# Patient Record
Sex: MALE | Race: WHITE | NOT HISPANIC OR LATINO | Employment: OTHER | URBAN - METROPOLITAN AREA
[De-identification: names, ages, dates, MRNs, and addresses within clinical notes are randomized per-mention and may not be internally consistent; named-entity substitution may affect disease eponyms.]

---

## 2019-02-15 ENCOUNTER — HOSPITAL ENCOUNTER (OUTPATIENT)
Dept: LAB | Facility: HOSPITAL | Age: 64
Discharge: HOME | End: 2019-02-15
Attending: INTERNAL MEDICINE

## 2019-02-15 LAB
ALBUMIN SERPL-MCNC: 4.2 G/DL (ref 3.5–5)
ALBUMIN/GLOB SERPL: 1.3 {RATIO} (ref 1.4–2.6)
ALP SERPL-CCNC: 62 U/L (ref 56–155)
ALT SERPL-CCNC: 35 U/L (ref 10–40)
ANION GAP SERPL CALC-SCNC: 22 MMOL/L (ref 8–19)
AST SERPL-CCNC: 28 U/L (ref 15–50)
BASOPHILS # BLD AUTO: 0.05 10*3/UL (ref 0–0.2)
BASOPHILS NFR BLD AUTO: 0.7 % (ref 0–3)
BILIRUB SERPL-MCNC: 0.5 MG/DL (ref 0.2–1.3)
BUN SERPL-MCNC: 21 MG/DL (ref 5–25)
BUN/CREAT SERPL: 17 {RATIO} (ref 6–20)
CALCIUM SERPL-MCNC: 9.2 MG/DL (ref 8.7–10.4)
CHLORIDE SERPL-SCNC: 103 MMOL/L (ref 99–111)
CHOLEST SERPL-MCNC: 138 MG/DL (ref 107–200)
CHOLEST/HDLC SERPL: 3.4 {RATIO} (ref 3–6)
CONV ABS IMM GRAN: 0.02 10*3/UL (ref 0–0.2)
CONV CO2: 21 MMOL/L (ref 22–32)
CONV IMMATURE GRAN: 0.3 % (ref 0–1.8)
CONV TOTAL PROTEIN: 7.4 G/DL (ref 6.3–8.2)
CREAT UR-MCNC: 1.21 MG/DL (ref 0.7–1.2)
DEPRECATED RDW RBC AUTO: 51.3 FL (ref 35.1–43.9)
EOSINOPHIL # BLD AUTO: 0.37 10*3/UL (ref 0–0.7)
EOSINOPHIL # BLD AUTO: 5.2 % (ref 0–7)
ERYTHROCYTE [DISTWIDTH] IN BLOOD BY AUTOMATED COUNT: 13.7 % (ref 11.6–14.4)
GFR SERPLBLD BASED ON 1.73 SQ M-ARVRAT: >60 ML/MIN/{1.73_M2}
GLOBULIN UR ELPH-MCNC: 3.2 G/DL (ref 2–3.5)
GLUCOSE SERPL-MCNC: 103 MG/DL (ref 70–99)
HBA1C MFR BLD: 16.1 G/DL (ref 14–18)
HCT VFR BLD AUTO: 48.7 % (ref 42–52)
HDLC SERPL-MCNC: 41 MG/DL (ref 40–60)
LDLC SERPL CALC-MCNC: 66 MG/DL (ref 70–100)
LYMPHOCYTES # BLD AUTO: 2.13 10*3/UL (ref 1–5)
MCH RBC QN AUTO: 33.4 PG (ref 27–31)
MCHC RBC AUTO-ENTMCNC: 33.1 G/DL (ref 33–37)
MCV RBC AUTO: 101 FL (ref 80–96)
MONOCYTES # BLD AUTO: 0.51 10*3/UL (ref 0.2–1.2)
MONOCYTES NFR BLD AUTO: 7.2 % (ref 3–10)
NEUTROPHILS # BLD AUTO: 4.03 10*3/UL (ref 2–8)
NEUTROPHILS NFR BLD AUTO: 56.6 % (ref 30–85)
NRBC CBCN: 0 % (ref 0–0.7)
OSMOLALITY SERPL CALC.SUM OF ELEC: 295 MOSM/KG (ref 273–304)
PLATELET # BLD AUTO: 253 10*3/UL (ref 130–400)
PMV BLD AUTO: 10.5 FL (ref 9.4–12.4)
POTASSIUM SERPL-SCNC: 4.6 MMOL/L (ref 3.5–5.3)
PSA SERPL-MCNC: 2.45 NG/ML (ref 0–4)
RBC # BLD AUTO: 4.82 10*6/UL (ref 4.7–6.1)
SODIUM SERPL-SCNC: 141 MMOL/L (ref 135–147)
TRIGL SERPL-MCNC: 157 MG/DL (ref 40–150)
VARIANT LYMPHS NFR BLD MANUAL: 30 % (ref 20–45)
VLDLC SERPL-MCNC: 31 MG/DL (ref 5–37)
WBC # BLD AUTO: 7.11 10*3/UL (ref 4.8–10.8)

## 2019-03-01 ENCOUNTER — HOSPITAL ENCOUNTER (OUTPATIENT)
Dept: URGENT CARE | Facility: CLINIC | Age: 64
Discharge: HOME | End: 2019-03-01
Attending: NURSE PRACTITIONER

## 2019-07-09 ENCOUNTER — TRANSCRIBE ORDERS (OUTPATIENT)
Dept: ADMINISTRATIVE | Facility: HOSPITAL | Age: 64
End: 2019-07-09

## 2019-07-09 DIAGNOSIS — I65.23 CAROTID STENOSIS, BILATERAL: Primary | ICD-10-CM

## 2019-07-18 ENCOUNTER — HOSPITAL ENCOUNTER (OUTPATIENT)
Dept: CT IMAGING | Facility: HOSPITAL | Age: 64
Discharge: HOME | End: 2019-07-18
Admitting: SURGERY

## 2019-07-18 DIAGNOSIS — I65.23 CAROTID STENOSIS, BILATERAL: ICD-10-CM

## 2019-07-18 LAB — CREAT BLDA-MCNC: 1 MG/DL (ref 0.6–1.3)

## 2019-07-18 PROCEDURE — 82565 ASSAY OF CREATININE: CPT

## 2019-07-18 PROCEDURE — 70496 CT ANGIOGRAPHY HEAD: CPT

## 2019-07-18 PROCEDURE — 25010000002 IOPAMIDOL 61 % SOLUTION: Performed by: SURGERY

## 2019-07-18 PROCEDURE — 70498 CT ANGIOGRAPHY NECK: CPT

## 2019-07-18 RX ADMIN — IOPAMIDOL 95 ML: 612 INJECTION, SOLUTION INTRAVENOUS at 11:48

## 2019-08-13 ENCOUNTER — HOSPITAL ENCOUNTER (OUTPATIENT)
Dept: LAB | Facility: HOSPITAL | Age: 64
Discharge: HOME | End: 2019-08-13
Attending: INTERNAL MEDICINE

## 2019-08-13 LAB
ALBUMIN SERPL-MCNC: 4.3 G/DL (ref 3.5–5)
ALBUMIN/GLOB SERPL: 1.5 {RATIO} (ref 1.4–2.6)
ALP SERPL-CCNC: 63 U/L (ref 56–155)
ALT SERPL-CCNC: 39 U/L (ref 10–40)
ANION GAP SERPL CALC-SCNC: 19 MMOL/L (ref 8–19)
AST SERPL-CCNC: 26 U/L (ref 15–50)
BILIRUB SERPL-MCNC: 0.33 MG/DL (ref 0.2–1.3)
BUN SERPL-MCNC: 19 MG/DL (ref 5–25)
BUN/CREAT SERPL: 18 {RATIO} (ref 6–20)
CALCIUM SERPL-MCNC: 9.5 MG/DL (ref 8.7–10.4)
CHLORIDE SERPL-SCNC: 104 MMOL/L (ref 99–111)
CONV CO2: 23 MMOL/L (ref 22–32)
CONV TOTAL PROTEIN: 7.2 G/DL (ref 6.3–8.2)
CREAT UR-MCNC: 1.07 MG/DL (ref 0.7–1.2)
EST. AVERAGE GLUCOSE BLD GHB EST-MCNC: 123 MG/DL
GFR SERPLBLD BASED ON 1.73 SQ M-ARVRAT: >60 ML/MIN/{1.73_M2}
GLOBULIN UR ELPH-MCNC: 2.9 G/DL (ref 2–3.5)
GLUCOSE SERPL-MCNC: 103 MG/DL (ref 70–99)
HBA1C MFR BLD: 5.9 % (ref 3.5–5.7)
OSMOLALITY SERPL CALC.SUM OF ELEC: 295 MOSM/KG (ref 273–304)
POTASSIUM SERPL-SCNC: 4.7 MMOL/L (ref 3.5–5.3)
SODIUM SERPL-SCNC: 141 MMOL/L (ref 135–147)

## 2019-08-15 ENCOUNTER — OFFICE VISIT (OUTPATIENT)
Dept: CARDIOLOGY | Facility: CLINIC | Age: 64
End: 2019-08-15

## 2019-08-15 ENCOUNTER — APPOINTMENT (OUTPATIENT)
Dept: PREADMISSION TESTING | Facility: HOSPITAL | Age: 64
End: 2019-08-15

## 2019-08-15 VITALS
HEART RATE: 60 BPM | BODY MASS INDEX: 29.06 KG/M2 | OXYGEN SATURATION: 97 % | DIASTOLIC BLOOD PRESSURE: 73 MMHG | HEIGHT: 70 IN | RESPIRATION RATE: 16 BRPM | SYSTOLIC BLOOD PRESSURE: 154 MMHG | TEMPERATURE: 97.6 F | WEIGHT: 203 LBS

## 2019-08-15 VITALS
WEIGHT: 223.4 LBS | BODY MASS INDEX: 31.98 KG/M2 | DIASTOLIC BLOOD PRESSURE: 90 MMHG | HEART RATE: 59 BPM | HEIGHT: 70 IN | SYSTOLIC BLOOD PRESSURE: 152 MMHG

## 2019-08-15 DIAGNOSIS — Z01.810 PREOPERATIVE CARDIOVASCULAR EXAMINATION: Primary | ICD-10-CM

## 2019-08-15 DIAGNOSIS — E78.2 MIXED HYPERLIPIDEMIA: ICD-10-CM

## 2019-08-15 DIAGNOSIS — I65.21 STENOSIS OF RIGHT CAROTID ARTERY: ICD-10-CM

## 2019-08-15 DIAGNOSIS — R06.09 DYSPNEA ON EXERTION: ICD-10-CM

## 2019-08-15 DIAGNOSIS — I10 ESSENTIAL HYPERTENSION: ICD-10-CM

## 2019-08-15 DIAGNOSIS — Z86.73 H/O: CVA (CEREBROVASCULAR ACCIDENT): ICD-10-CM

## 2019-08-15 LAB
DEPRECATED RDW RBC AUTO: 50.9 FL (ref 37–54)
ERYTHROCYTE [DISTWIDTH] IN BLOOD BY AUTOMATED COUNT: 13.8 % (ref 12.3–15.4)
HCT VFR BLD AUTO: 46.7 % (ref 37.5–51)
HGB BLD-MCNC: 15.6 G/DL (ref 13–17.7)
MCH RBC QN AUTO: 33.5 PG (ref 26.6–33)
MCHC RBC AUTO-ENTMCNC: 33.4 G/DL (ref 31.5–35.7)
MCV RBC AUTO: 100.2 FL (ref 79–97)
PLATELET # BLD AUTO: 223 10*3/MM3 (ref 140–450)
PMV BLD AUTO: 10.3 FL (ref 6–12)
RBC # BLD AUTO: 4.66 10*6/MM3 (ref 4.14–5.8)
WBC NRBC COR # BLD: 6.67 10*3/MM3 (ref 3.4–10.8)

## 2019-08-15 PROCEDURE — 36415 COLL VENOUS BLD VENIPUNCTURE: CPT

## 2019-08-15 PROCEDURE — 99204 OFFICE O/P NEW MOD 45 MIN: CPT | Performed by: INTERNAL MEDICINE

## 2019-08-15 PROCEDURE — 85027 COMPLETE CBC AUTOMATED: CPT | Performed by: SURGERY

## 2019-08-15 PROCEDURE — 93000 ELECTROCARDIOGRAM COMPLETE: CPT | Performed by: INTERNAL MEDICINE

## 2019-08-15 RX ORDER — DULOXETIN HYDROCHLORIDE 60 MG/1
1 CAPSULE, DELAYED RELEASE ORAL NIGHTLY
Refills: 0 | COMMUNITY
Start: 2019-06-25 | End: 2021-09-20

## 2019-08-15 RX ORDER — CLOPIDOGREL BISULFATE 75 MG/1
1 TABLET ORAL DAILY
Refills: 2 | COMMUNITY
Start: 2019-08-08 | End: 2022-05-24

## 2019-08-15 RX ORDER — QUINAPRIL 40 MG/1
40 TABLET ORAL NIGHTLY
Qty: 90 TABLET | Refills: 3 | Status: SHIPPED | COMMUNITY
Start: 2019-08-15 | End: 2021-09-17

## 2019-08-15 RX ORDER — ISOSORBIDE MONONITRATE 30 MG/1
30 TABLET, EXTENDED RELEASE ORAL AS NEEDED
Status: ON HOLD | COMMUNITY
End: 2019-08-29

## 2019-08-15 RX ORDER — ATORVASTATIN CALCIUM 20 MG/1
1 TABLET, FILM COATED ORAL NIGHTLY
Refills: 3 | COMMUNITY
Start: 2019-08-08 | End: 2021-07-08 | Stop reason: SDUPTHER

## 2019-08-15 RX ORDER — QUINAPRIL 20 MG/1
1 TABLET ORAL DAILY
Refills: 3 | COMMUNITY
Start: 2019-08-08 | End: 2019-08-15

## 2019-08-15 RX ORDER — LIDOCAINE 50 MG/G
1 PATCH TOPICAL AS NEEDED
Status: ON HOLD | COMMUNITY
End: 2019-08-29

## 2019-08-15 RX ORDER — AMLODIPINE BESYLATE 2.5 MG/1
2.5 TABLET ORAL DAILY
Qty: 90 TABLET | Refills: 3 | Status: SHIPPED | OUTPATIENT
Start: 2019-08-15 | End: 2022-06-15

## 2019-08-15 RX ORDER — NITROGLYCERIN 0.4 MG/1
0.4 TABLET SUBLINGUAL
COMMUNITY
End: 2021-11-24

## 2019-08-15 RX ORDER — CETIRIZINE HYDROCHLORIDE 10 MG/1
10 TABLET ORAL DAILY
COMMUNITY

## 2019-08-15 NOTE — DISCHARGE INSTRUCTIONS
Take the following medications the morning of surgery with a small sip of water:  AMLODIPINE      General Instructions: CLEAR LIQUIDS UNTIL 4:30 AM MORNING OF SURGERY  • Do not eat solid food after midnight the night before surgery.  • You may drink clear liquids day of surgery but must stop at least one hour before your hospital arrival time.  • It is beneficial for you to have a clear drink that contains carbohydrates the day of surgery.  We suggest a 12 to 20 ounce bottle of Gatorade or Powerade for non-diabetic patients or a 12 to 20 ounce bottle of G2 or Powerade Zero for diabetic patients. (Pediatric patients, are not advised to drink a 12 to 20 ounce carbohydrate drink)    Clear liquids are liquids you can see through.  Nothing red in color.     Plain water                               Sports drinks  Sodas                                   Gelatin (Jell-O)  Fruit juices without pulp such as white grape juice and apple juice  Popsicles that contain no fruit or yogurt  Tea or coffee (no cream or milk added)  Gatorade / Powerade  G2 / Powerade Zero    • Infants may have breast milk up to four hours before surgery.  • Infants drinking formula may drink formula up to six hours before surgery.   • Patients who avoid smoking, chewing tobacco and alcohol for 4 weeks prior to surgery have a reduced risk of post-operative complications.  Quit smoking as many days before surgery as you can.  • Do not smoke, use chewing tobacco or drink alcohol the day of surgery.   • If applicable bring your C-PAP/ BI-PAP machine.  • Bring any papers given to you in the doctor’s office.  • Wear clean comfortable clothes and socks.  • Do not wear contact lenses, false eyelashes or make-up.  Bring a case for your glasses.   • Bring crutches or walker if applicable.  • Remove all piercings.  Leave jewelry and any other valuables at home.  • Hair extensions with metal clips must be removed prior to surgery.  • The Pre-Admission Testing  nurse will instruct you to bring medications if unable to obtain an accurate list in Pre-Admission Testing.        If you were given a blood bank ID arm band remember to bring it with you the day of surgery.    Preventing a Surgical Site Infection:  • For 2 to 3 days before surgery, avoid shaving with a razor because the razor can irritate skin and make it easier to develop an infection.    • Any areas of open skin can increase the risk of a post-operative wound infection by allowing bacteria to enter and travel throughout the body.  Notify your surgeon if you have any skin wounds / rashes even if it is not near the expected surgical site.  The area will need assessed to determine if surgery should be delayed until it is healed.  • The night prior to surgery sleep in a clean bed with clean clothing.  Do not allow pets to sleep with you.  • Shower on the morning of surgery using a fresh bar of anti-bacterial soap (such as Dial) and clean washcloth.  Dry with a clean towel and dress in clean clothing.  • Ask your surgeon if you will be receiving antibiotics prior to surgery.  • Make sure you, your family, and all healthcare providers clean their hands with soap and water or an alcohol based hand  before caring for you or your wound.    Day of surgery: 8/29/2019 ARRIVAL TIME 5:30 AM  Upon arrival, a Pre-op nurse and Anesthesiologist will review your health history, obtain vital signs, and answer questions you may have.  The only belongings needed at this time will be a list of your home medications and if applicable your C-PAP/BI-PAP machine.  If you are staying overnight your family can leave the rest of your belongings in the car and bring them to your room later.  A Pre-op nurse will start an IV and you may receive medication in preparation for surgery, including something to help you relax.  Your family will be able to see you in the Pre-op area.  While you are in surgery your family should notify the  waiting room  if they leave the waiting room area and provide a contact phone number.    Please be aware that surgery does come with discomfort.  We want to make every effort to control your discomfort so please discuss any uncontrolled symptoms with your nurse.   Your doctor will most likely have prescribed pain medications.      If you are going home after surgery you will receive individualized written care instructions before being discharged.  A responsible adult must drive you to and from the hospital on the day of your surgery and stay with you for 24 hours.    If you are staying overnight following surgery, you will be transported to your hospital room following the recovery period.  [facility] has all private rooms.    You have received a list of surgical assistants for your reference.  If you have any questions please call Pre-Admission Testing at [phone].  Deductibles and co-payments are collected on the day of service. Please be prepared to pay the required co-pay, deductible or deposit on the day of service as defined by your plan.

## 2019-08-15 NOTE — PROGRESS NOTES
Date of Office Visit: 08/15/2019  Encounter Provider: Danisha Lehman MD  Place of Service: [facility] CARDIOLOGY  Patient Name: Aram Sarmiento  :1955      Patient ID:  Aram Sarmiento is a 63 y.o. male is here for preoperative evaluation.           History of Present Illness    He has a history of stroke in .  He follows with Dr. Vega.  He has severe, 75% stenosis, of the right internal carotid artery.  He is 20% stenosis of left internal carotid artery.  He had CT angiogram of the head neck done 2019 and this showed old stroke in the right middle cerebral artery territory as well as a left cerebellar infarct.  He is scheduled to have surgery with Dr. Vega for right carotid artery stenosis.    He has a history of hypertension, hyperlipidemia.  He has had 2 cardiac catheterizations in the past, in  and .  I do not have those reports.    He is  has 3 children.  Is retired.  He smokes a pack of cigarettes a day.  He has occasional alcohol.  He has 1 cup of coffee per day.    His father has had 5 coronary stents beginning in his 70s and his maternal grandmother had heart disease.  His maternal grandmother also had diabetes, stroke and cancer.  His mother had cancer.    He has diaphoresis with activity and it wakes him up at night.  He occasionally wakes out of sleep with chest tightness.  He has dyspnea on exertion.  His energy level is poor.  He does not exercise regularly.  He does not feel his heart racing or skipping.  He has had no syncope or dizziness.  He has no orthopnea or PND.  His blood pressure is always high.    Past Medical History:   Diagnosis Date   • Carotid artery stenosis     Bilateral   • Hyperlipidemia    • Hypertension    • Myocardial infarction (CMS/HCC)    • Personal history of transient ischemic attack (TIA), and cerebral infarction without residual deficits    • Postinfarction angina (CMS/HCC)          Past Surgical  History:   Procedure Laterality Date   • CARDIAC CATHETERIZATION     • CLAVICLE SURGERY  2013   • COLONOSCOPY  2016       Current Outpatient Medications on File Prior to Visit   Medication Sig Dispense Refill   • Acetaminophen (TYLENOL PO) Take 1,000 mg by mouth Daily.     • atorvastatin (LIPITOR) 20 MG tablet Take 1 tablet by mouth Daily.  3   • cetirizine (zyrTEC) 10 MG tablet Take 10 mg by mouth Daily.     • clopidogrel (PLAVIX) 75 MG tablet Take 1 tablet by mouth Daily.  2   • DULoxetine (CYMBALTA) 60 MG capsule Take 1 capsule by mouth Daily.  0   • isosorbide mononitrate (IMDUR) 30 MG 24 hr tablet Take 30 mg by mouth As Needed.     • lidocaine (LIDODERM) 5 % Place 1 patch on the skin as directed by provider As Needed for Mild Pain . Remove & Discard patch within 12 hours or as directed by MD     • nitroglycerin (NITROSTAT) 0.4 MG SL tablet Place 0.4 mg under the tongue Every 5 (Five) Minutes As Needed for Chest Pain. Take no more than 3 doses in 15 minutes.     • quinapril (ACCUPRIL) 40 MG tablet Take 1 tablet by mouth Daily. 90 tablet 3   • [DISCONTINUED] quinapril (ACCUPRIL) 20 MG tablet Take 1 tablet by mouth Daily.  3     No current facility-administered medications on file prior to visit.        Social History     Socioeconomic History   • Marital status:      Spouse name: Not on file   • Number of children: Not on file   • Years of education: Not on file   • Highest education level: Not on file   Tobacco Use   • Smoking status: Current Every Day Smoker     Packs/day: 1.00   • Smokeless tobacco: Never Used   • Tobacco comment: caffeine use   Substance and Sexual Activity   • Alcohol use: Yes     Comment: occ           Review of Systems   Constitution: Negative.   HENT: Negative for congestion.    Eyes: Negative for vision loss in left eye and vision loss in right eye.   Respiratory: Negative.  Negative for cough, hemoptysis, shortness of breath, sleep disturbances due to breathing, snoring, sputum  "production and wheezing.    Endocrine: Negative.    Hematologic/Lymphatic: Negative.    Skin: Negative for poor wound healing and rash.   Musculoskeletal: Negative for falls, gout, muscle cramps and myalgias.   Gastrointestinal: Negative for abdominal pain, diarrhea, dysphagia, hematemesis, melena, nausea and vomiting.   Neurological: Negative for excessive daytime sleepiness, dizziness, headaches, light-headedness, loss of balance, seizures and vertigo.   Psychiatric/Behavioral: Negative for depression and substance abuse. The patient is not nervous/anxious.        Procedures    ECG 12 Lead  Date/Time: 8/15/2019 9:10 AM  Performed by: Danisha Lehman MD  Authorized by: Danisha Lehman MD   Comparison: not compared with previous ECG   Previous ECG: no previous ECG available  Rhythm: sinus rhythm  T inversion: II, III, aVF and V6  T flattening: I and aVL    Clinical impression: abnormal EKG                Objective:      Vitals:    08/15/19 0855 08/15/19 0859   BP: 150/88 152/90   BP Location: Right arm Left arm   Patient Position: Sitting Sitting   Pulse: 59    Weight: 101 kg (223 lb 6.4 oz)    Height: 177.8 cm (70\")      Body mass index is 32.05 kg/m².    Physical Exam   Constitutional: He is oriented to person, place, and time. He appears well-developed and well-nourished. No distress.   HENT:   Head: Normocephalic and atraumatic.   Eyes: Conjunctivae are normal. No scleral icterus.   Neck: Neck supple. No JVD present. Carotid bruit is not present. No thyromegaly present.   Cardiovascular: Normal rate, regular rhythm, S1 normal, S2 normal, normal heart sounds and intact distal pulses.  No extrasystoles are present. PMI is not displaced. Exam reveals no gallop.   No murmur heard.  Pulses:       Carotid pulses are 2+ on the right side, and 2+ on the left side.       Radial pulses are 2+ on the right side, and 2+ on the left side.        Dorsalis pedis pulses are 2+ on the right side, and 2+ on the left " side.        Posterior tibial pulses are 2+ on the right side, and 2+ on the left side.   Pulmonary/Chest: Effort normal and breath sounds normal. No respiratory distress. He has no wheezes. He has no rhonchi. He has no rales. He exhibits no tenderness.   Abdominal: Soft. Bowel sounds are normal. He exhibits no distension, no abdominal bruit and no mass. There is no tenderness.   Musculoskeletal: He exhibits no edema or deformity.   Lymphadenopathy:     He has no cervical adenopathy.   Neurological: He is alert and oriented to person, place, and time. No cranial nerve deficit.   Skin: Skin is warm and dry. No rash noted. He is not diaphoretic. No cyanosis. No pallor. Nails show no clubbing.   Psychiatric: He has a normal mood and affect. Judgment normal.   Vitals reviewed.      Lab Review:       Assessment:      Diagnosis Plan   1. Preoperative cardiovascular examination  Stress Test With Myocardial Perfusion One Day    Adult Transthoracic Echo Complete W/ Cont if Necessary Per Protocol   2. Essential hypertension     3. Mixed hyperlipidemia     4. H/O: CVA (cerebrovascular accident)  Stress Test With Myocardial Perfusion One Day    Adult Transthoracic Echo Complete W/ Cont if Necessary Per Protocol   5. Stenosis of right carotid artery  Stress Test With Myocardial Perfusion One Day    Adult Transthoracic Echo Complete W/ Cont if Necessary Per Protocol   6. Dyspnea on exertion  Stress Test With Myocardial Perfusion One Day    Adult Transthoracic Echo Complete W/ Cont if Necessary Per Protocol     1. Preoperative cardiovascular evaluation for right carotid artery surgery with Dr. Vega  2. Abnormal ECG  3. Prior strokes noted on CT head  4. Hypertension, goal <120/80.   5. Hyperlipidemia, on atorvastatin.  6. Tobacco use advised cessation, tried Chantix.     Plan:       See bebeto in 1 year.  Increase quinapril to 40 mg daily and start amlodipine 2.5 mg daily.  Set up testing.  We had a long discussion about smoking  cessation, he is going to try.

## 2019-08-21 ENCOUNTER — HOSPITAL ENCOUNTER (OUTPATIENT)
Dept: CARDIOLOGY | Facility: HOSPITAL | Age: 64
Discharge: HOME | End: 2019-08-21

## 2019-08-21 ENCOUNTER — HOSPITAL ENCOUNTER (OUTPATIENT)
Dept: CARDIOLOGY | Facility: HOSPITAL | Age: 64
Discharge: HOME | End: 2019-08-21
Admitting: INTERNAL MEDICINE

## 2019-08-21 VITALS — BODY MASS INDEX: 31.5 KG/M2 | WEIGHT: 220 LBS | HEIGHT: 70 IN

## 2019-08-21 VITALS
HEIGHT: 70 IN | BODY MASS INDEX: 29.06 KG/M2 | WEIGHT: 203 LBS | SYSTOLIC BLOOD PRESSURE: 140 MMHG | HEART RATE: 50 BPM | DIASTOLIC BLOOD PRESSURE: 80 MMHG

## 2019-08-21 DIAGNOSIS — Z01.810 PREOPERATIVE CARDIOVASCULAR EXAMINATION: ICD-10-CM

## 2019-08-21 DIAGNOSIS — I65.21 STENOSIS OF RIGHT CAROTID ARTERY: ICD-10-CM

## 2019-08-21 DIAGNOSIS — Z86.73 H/O: CVA (CEREBROVASCULAR ACCIDENT): ICD-10-CM

## 2019-08-21 DIAGNOSIS — R06.09 DYSPNEA ON EXERTION: ICD-10-CM

## 2019-08-21 LAB
AORTIC ROOT ANNULUS: 2.2 CM
ASCENDING AORTA: 3.9 CM
BH CV ECHO MEAS - ACS: 2.3 CM
BH CV ECHO MEAS - AO MAX PG (FULL): 4.3 MMHG
BH CV ECHO MEAS - AO MAX PG: 12.6 MMHG
BH CV ECHO MEAS - AO MEAN PG (FULL): 2.2 MMHG
BH CV ECHO MEAS - AO MEAN PG: 6.1 MMHG
BH CV ECHO MEAS - AO V2 MAX: 177.8 CM/SEC
BH CV ECHO MEAS - AO V2 MEAN: 113.1 CM/SEC
BH CV ECHO MEAS - AO V2 VTI: 37.8 CM
BH CV ECHO MEAS - ASC AORTA: 3.9 CM
BH CV ECHO MEAS - AVA(I,A): 3.3 CM^2
BH CV ECHO MEAS - AVA(I,D): 3.3 CM^2
BH CV ECHO MEAS - AVA(V,A): 2.9 CM^2
BH CV ECHO MEAS - AVA(V,D): 2.9 CM^2
BH CV ECHO MEAS - BSA(HAYCOCK): 2.2 M^2
BH CV ECHO MEAS - BSA: 2.1 M^2
BH CV ECHO MEAS - BZI_BMI: 29.1 KILOGRAMS/M^2
BH CV ECHO MEAS - BZI_METRIC_HEIGHT: 177.8 CM
BH CV ECHO MEAS - BZI_METRIC_WEIGHT: 92.1 KG
BH CV ECHO MEAS - EDV(MOD-SP2): 83 ML
BH CV ECHO MEAS - EDV(MOD-SP4): 75 ML
BH CV ECHO MEAS - EDV(TEICH): 101.9 ML
BH CV ECHO MEAS - EF(CUBED): 86.8 %
BH CV ECHO MEAS - EF(MOD-BP): 61 %
BH CV ECHO MEAS - EF(MOD-SP2): 60.2 %
BH CV ECHO MEAS - EF(MOD-SP4): 60 %
BH CV ECHO MEAS - EF(TEICH): 80.4 %
BH CV ECHO MEAS - ESV(MOD-SP2): 33 ML
BH CV ECHO MEAS - ESV(MOD-SP4): 30 ML
BH CV ECHO MEAS - ESV(TEICH): 19.9 ML
BH CV ECHO MEAS - FS: 49.1 %
BH CV ECHO MEAS - IVS/LVPW: 1.1
BH CV ECHO MEAS - IVSD: 1.3 CM
BH CV ECHO MEAS - LAT PEAK E' VEL: 9 CM/SEC
BH CV ECHO MEAS - LV DIASTOLIC VOL/BSA (35-75): 35.7 ML/M^2
BH CV ECHO MEAS - LV MASS(C)D: 215.4 GRAMS
BH CV ECHO MEAS - LV MASS(C)DI: 102.6 GRAMS/M^2
BH CV ECHO MEAS - LV MAX PG: 8.3 MMHG
BH CV ECHO MEAS - LV MEAN PG: 3.9 MMHG
BH CV ECHO MEAS - LV SYSTOLIC VOL/BSA (12-30): 14.3 ML/M^2
BH CV ECHO MEAS - LV V1 MAX: 144.1 CM/SEC
BH CV ECHO MEAS - LV V1 MEAN: 89.4 CM/SEC
BH CV ECHO MEAS - LV V1 VTI: 34.9 CM
BH CV ECHO MEAS - LVIDD: 4.7 CM
BH CV ECHO MEAS - LVIDS: 2.4 CM
BH CV ECHO MEAS - LVLD AP2: 8.7 CM
BH CV ECHO MEAS - LVLD AP4: 8.5 CM
BH CV ECHO MEAS - LVLS AP2: 7.2 CM
BH CV ECHO MEAS - LVLS AP4: 6.7 CM
BH CV ECHO MEAS - LVOT AREA (M): 3.5 CM^2
BH CV ECHO MEAS - LVOT AREA: 3.6 CM^2
BH CV ECHO MEAS - LVOT DIAM: 2.1 CM
BH CV ECHO MEAS - LVPWD: 1.2 CM
BH CV ECHO MEAS - MED PEAK E' VEL: 7 CM/SEC
BH CV ECHO MEAS - MV A DUR: 0.13 SEC
BH CV ECHO MEAS - MV A MAX VEL: 81 CM/SEC
BH CV ECHO MEAS - MV DEC SLOPE: 199 CM/SEC^2
BH CV ECHO MEAS - MV DEC TIME: 0.27 SEC
BH CV ECHO MEAS - MV E MAX VEL: 50 CM/SEC
BH CV ECHO MEAS - MV E/A: 0.62
BH CV ECHO MEAS - MV MAX PG: 4.3 MMHG
BH CV ECHO MEAS - MV MEAN PG: 1.1 MMHG
BH CV ECHO MEAS - MV P1/2T MAX VEL: 53.8 CM/SEC
BH CV ECHO MEAS - MV P1/2T: 79.3 MSEC
BH CV ECHO MEAS - MV V2 MAX: 103.2 CM/SEC
BH CV ECHO MEAS - MV V2 MEAN: 48.7 CM/SEC
BH CV ECHO MEAS - MV V2 VTI: 39.6 CM
BH CV ECHO MEAS - MVA P1/2T LCG: 4.1 CM^2
BH CV ECHO MEAS - MVA(P1/2T): 2.8 CM^2
BH CV ECHO MEAS - MVA(VTI): 3.2 CM^2
BH CV ECHO MEAS - PA ACC TIME: 0.14 SEC
BH CV ECHO MEAS - PA MAX PG (FULL): 5.1 MMHG
BH CV ECHO MEAS - PA MAX PG: 6.7 MMHG
BH CV ECHO MEAS - PA PR(ACCEL): 14 MMHG
BH CV ECHO MEAS - PA V2 MAX: 129 CM/SEC
BH CV ECHO MEAS - PULM A REVS DUR: 0.11 SEC
BH CV ECHO MEAS - PULM A REVS VEL: 24.8 CM/SEC
BH CV ECHO MEAS - PULM DIAS VEL: 48.1 CM/SEC
BH CV ECHO MEAS - PULM S/D: 1.4
BH CV ECHO MEAS - PULM SYS VEL: 66.2 CM/SEC
BH CV ECHO MEAS - PVA(V,A): 2.2 CM^2
BH CV ECHO MEAS - PVA(V,D): 2.2 CM^2
BH CV ECHO MEAS - QP/QS: 0.61
BH CV ECHO MEAS - RV MAX PG: 1.6 MMHG
BH CV ECHO MEAS - RV MEAN PG: 0.98 MMHG
BH CV ECHO MEAS - RV V1 MAX: 62.6 CM/SEC
BH CV ECHO MEAS - RV V1 MEAN: 47 CM/SEC
BH CV ECHO MEAS - RV V1 VTI: 17.2 CM
BH CV ECHO MEAS - RVOT AREA: 4.4 CM^2
BH CV ECHO MEAS - RVOT DIAM: 2.4 CM
BH CV ECHO MEAS - SI(CUBED): 42.6 ML/M^2
BH CV ECHO MEAS - SI(LVOT): 59.9 ML/M^2
BH CV ECHO MEAS - SI(MOD-SP2): 23.8 ML/M^2
BH CV ECHO MEAS - SI(MOD-SP4): 21.4 ML/M^2
BH CV ECHO MEAS - SI(TEICH): 39 ML/M^2
BH CV ECHO MEAS - SV(CUBED): 89.6 ML
BH CV ECHO MEAS - SV(LVOT): 125.8 ML
BH CV ECHO MEAS - SV(MOD-SP2): 50 ML
BH CV ECHO MEAS - SV(MOD-SP4): 45 ML
BH CV ECHO MEAS - SV(RVOT): 76.4 ML
BH CV ECHO MEAS - SV(TEICH): 82 ML
BH CV ECHO MEAS - TAPSE (>1.6): 2.6 CM2
BH CV ECHO MEASUREMENTS AVERAGE E/E' RATIO: 6.25
BH CV NUCLEAR PRIOR STUDY: 3
BH CV STRESS BP STAGE 1: NORMAL
BH CV STRESS COMMENTS STAGE 1: NORMAL
BH CV STRESS DOSE REGADENOSON STAGE 1: 0.4
BH CV STRESS DURATION MIN STAGE 1: 0
BH CV STRESS DURATION SEC STAGE 1: 10
BH CV STRESS HR STAGE 1: 86
BH CV STRESS PROTOCOL 1: NORMAL
BH CV STRESS RECOVERY BP: NORMAL MMHG
BH CV STRESS RECOVERY HR: 73 BPM
BH CV STRESS STAGE 1: 1
BH CV XLRA - RV BASE: 2.8 CM
BH CV XLRA - TDI S': 12 CM/SEC
LEFT ATRIUM VOLUME INDEX: 23 ML/M2
LV EF NUC BP: 58 %
MAXIMAL PREDICTED HEART RATE: 156 BPM
PERCENT MAX PREDICTED HR: 55.13 %
SINUS: 4 CM
STJ: 3.9 CM
STRESS BASELINE BP: NORMAL MMHG
STRESS BASELINE HR: 60 BPM
STRESS PERCENT HR: 65 %
STRESS POST EXERCISE DUR SEC: 10 SEC
STRESS POST PEAK BP: NORMAL MMHG
STRESS POST PEAK HR: 86 BPM
STRESS TARGET HR: 133 BPM

## 2019-08-21 PROCEDURE — 0 TECHNETIUM TETROFOSMIN KIT: Performed by: INTERNAL MEDICINE

## 2019-08-21 PROCEDURE — 93018 CV STRESS TEST I&R ONLY: CPT | Performed by: INTERNAL MEDICINE

## 2019-08-21 PROCEDURE — 78452 HT MUSCLE IMAGE SPECT MULT: CPT

## 2019-08-21 PROCEDURE — 93016 CV STRESS TEST SUPVJ ONLY: CPT | Performed by: INTERNAL MEDICINE

## 2019-08-21 PROCEDURE — 25010000002 REGADENOSON 0.4 MG/5ML SOLUTION: Performed by: INTERNAL MEDICINE

## 2019-08-21 PROCEDURE — 93306 TTE W/DOPPLER COMPLETE: CPT | Performed by: INTERNAL MEDICINE

## 2019-08-21 PROCEDURE — A9502 TC99M TETROFOSMIN: HCPCS | Performed by: INTERNAL MEDICINE

## 2019-08-21 PROCEDURE — 78452 HT MUSCLE IMAGE SPECT MULT: CPT | Performed by: INTERNAL MEDICINE

## 2019-08-21 PROCEDURE — 93017 CV STRESS TEST TRACING ONLY: CPT

## 2019-08-21 PROCEDURE — 93306 TTE W/DOPPLER COMPLETE: CPT

## 2019-08-21 RX ADMIN — TETROFOSMIN 1 DOSE: 1.38 INJECTION, POWDER, LYOPHILIZED, FOR SOLUTION INTRAVENOUS at 10:15

## 2019-08-21 RX ADMIN — REGADENOSON 0.4 MG: 0.08 INJECTION, SOLUTION INTRAVENOUS at 10:27

## 2019-08-21 RX ADMIN — TETROFOSMIN 1 DOSE: 1.38 INJECTION, POWDER, LYOPHILIZED, FOR SOLUTION INTRAVENOUS at 09:11

## 2019-08-23 ENCOUNTER — TELEPHONE (OUTPATIENT)
Dept: CARDIOLOGY | Facility: CLINIC | Age: 64
End: 2019-08-23

## 2019-08-23 NOTE — TELEPHONE ENCOUNTER
Pt's wife called and wants know if its ok for him to use chantix before his surgery.....Chaya BERGER

## 2019-08-29 ENCOUNTER — ANESTHESIA (OUTPATIENT)
Dept: PERIOP | Facility: HOSPITAL | Age: 64
End: 2019-08-29

## 2019-08-29 ENCOUNTER — HOSPITAL ENCOUNTER (INPATIENT)
Facility: HOSPITAL | Age: 64
LOS: 1 days | Discharge: HOME | End: 2019-08-30
Attending: SURGERY | Admitting: SURGERY

## 2019-08-29 ENCOUNTER — APPOINTMENT (OUTPATIENT)
Dept: CARDIOLOGY | Facility: HOSPITAL | Age: 64
End: 2019-08-29

## 2019-08-29 ENCOUNTER — ANESTHESIA EVENT (OUTPATIENT)
Dept: PERIOP | Facility: HOSPITAL | Age: 64
End: 2019-08-29

## 2019-08-29 PROBLEM — I65.23 CAROTID STENOSIS, ASYMPTOMATIC, BILATERAL: Status: ACTIVE | Noted: 2019-08-29

## 2019-08-29 LAB
BH CV XLRA MEAS RIGHT DIST CCA EDV: 19.6 CM/SEC
BH CV XLRA MEAS RIGHT DIST CCA PSV: 61.2 CM/SEC
BH CV XLRA MEAS RIGHT PROX ECA EDV: 10.6 CM/SEC
BH CV XLRA MEAS RIGHT PROX ECA PSV: 121 CM/SEC
BH CV XLRA MEAS RIGHT PROX ICA EDV: 27.7 CM/SEC
BH CV XLRA MEAS RIGHT PROX ICA PSV: 91 CM/SEC

## 2019-08-29 PROCEDURE — 25010000002 HEPARIN (PORCINE) PER 1000 UNITS: Performed by: SURGERY

## 2019-08-29 PROCEDURE — 25010000002 HYDRALAZINE PER 20 MG: Performed by: NURSE ANESTHETIST, CERTIFIED REGISTERED

## 2019-08-29 PROCEDURE — 25010000002 PHENYLEPHRINE PER 1 ML: Performed by: NURSE ANESTHETIST, CERTIFIED REGISTERED

## 2019-08-29 PROCEDURE — 25010000002 FENTANYL CITRATE (PF) 100 MCG/2ML SOLUTION: Performed by: ANESTHESIOLOGY

## 2019-08-29 PROCEDURE — 25010000002 DEXAMETHASONE PER 1 MG: Performed by: NURSE ANESTHETIST, CERTIFIED REGISTERED

## 2019-08-29 PROCEDURE — 25010000002 FENTANYL CITRATE (PF) 100 MCG/2ML SOLUTION: Performed by: NURSE ANESTHETIST, CERTIFIED REGISTERED

## 2019-08-29 PROCEDURE — C1768 GRAFT, VASCULAR: HCPCS | Performed by: SURGERY

## 2019-08-29 PROCEDURE — 25010000002 PROPOFOL 10 MG/ML EMULSION: Performed by: NURSE ANESTHETIST, CERTIFIED REGISTERED

## 2019-08-29 PROCEDURE — 03CK0ZZ EXTIRPATION OF MATTER FROM RIGHT INTERNAL CAROTID ARTERY, OPEN APPROACH: ICD-10-PCS | Performed by: SURGERY

## 2019-08-29 PROCEDURE — 25010000002 HYDROMORPHONE PER 4 MG: Performed by: NURSE ANESTHETIST, CERTIFIED REGISTERED

## 2019-08-29 PROCEDURE — 93882 EXTRACRANIAL UNI/LTD STUDY: CPT

## 2019-08-29 PROCEDURE — 85347 COAGULATION TIME ACTIVATED: CPT

## 2019-08-29 PROCEDURE — 03UK0KZ SUPPLEMENT RIGHT INTERNAL CAROTID ARTERY WITH NONAUTOLOGOUS TISSUE SUBSTITUTE, OPEN APPROACH: ICD-10-PCS | Performed by: SURGERY

## 2019-08-29 PROCEDURE — 25010000002 HEPARIN (PORCINE) PER 1000 UNITS: Performed by: NURSE ANESTHETIST, CERTIFIED REGISTERED

## 2019-08-29 PROCEDURE — 25010000002 ONDANSETRON PER 1 MG: Performed by: NURSE ANESTHETIST, CERTIFIED REGISTERED

## 2019-08-29 PROCEDURE — 25010000002 PROTAMINE SULFATE PER 10 MG: Performed by: NURSE ANESTHETIST, CERTIFIED REGISTERED

## 2019-08-29 PROCEDURE — 25010000002 MIDAZOLAM PER 1 MG: Performed by: ANESTHESIOLOGY

## 2019-08-29 DEVICE — VASCU-GUARD PERIPHERAL VASCULAR PATCH IS PREPARED FROM BOVINE PERICARDIUM WHICH IS CROSS-LINKED WITH GLUTARALDEHYDE. THE PERICARDIUM IS PROCURED FROM CATTLE ORIGINATING IN THE UNITED STATES. VASCU-GUARD PERIPHERAL VASCULAR PATCH IS CHEMICALLY STERILIZED USING ETHANOL AND PROPYLENE OXIDE. VASCU-GUARD PERIPHERAL VASCULAR PATCH HAS BEEN TREATED WITH 1 MOLAR SODIUM HYDROXIDE FOR A MINIMUM OF 60 MINUTES AT 20 - 25°C.  VASCU-GUARD PERIPHERAL VASCULAR PATCH IS PACKAGED IN A CONTAINER FILLED WITH STERILE, NON-PYROGENIC WATER CONTAINING PROPYLENE OXIDE. THE CONTENTS OF THE UNOPENED, UNDAMAGED CONTAINER ARE STERILE.
Type: IMPLANTABLE DEVICE | Site: CAROTID | Status: FUNCTIONAL
Brand: VASCU-GUARD PERIPHERAL VASCULAR PATCH

## 2019-08-29 RX ORDER — VARENICLINE TARTRATE 0.5 MG/1
1 TABLET, FILM COATED ORAL 2 TIMES DAILY WITH MEALS
Status: DISCONTINUED | OUTPATIENT
Start: 2019-08-29 | End: 2019-08-30 | Stop reason: HOSPADM

## 2019-08-29 RX ORDER — HYDROCODONE BITARTRATE AND ACETAMINOPHEN 7.5; 325 MG/1; MG/1
1 TABLET ORAL ONCE AS NEEDED
Status: COMPLETED | OUTPATIENT
Start: 2019-08-29 | End: 2019-08-29

## 2019-08-29 RX ORDER — FENTANYL CITRATE 50 UG/ML
50 INJECTION, SOLUTION INTRAMUSCULAR; INTRAVENOUS
Status: DISCONTINUED | OUTPATIENT
Start: 2019-08-29 | End: 2019-08-29 | Stop reason: HOSPADM

## 2019-08-29 RX ORDER — FAMOTIDINE 10 MG/ML
20 INJECTION, SOLUTION INTRAVENOUS ONCE
Status: COMPLETED | OUTPATIENT
Start: 2019-08-29 | End: 2019-08-29

## 2019-08-29 RX ORDER — HEPARIN SODIUM 1000 [USP'U]/ML
INJECTION, SOLUTION INTRAVENOUS; SUBCUTANEOUS AS NEEDED
Status: DISCONTINUED | OUTPATIENT
Start: 2019-08-29 | End: 2019-08-29 | Stop reason: SURG

## 2019-08-29 RX ORDER — ROCURONIUM BROMIDE 10 MG/ML
INJECTION, SOLUTION INTRAVENOUS AS NEEDED
Status: DISCONTINUED | OUTPATIENT
Start: 2019-08-29 | End: 2019-08-29 | Stop reason: SURG

## 2019-08-29 RX ORDER — GLYCOPYRROLATE 0.2 MG/ML
INJECTION INTRAMUSCULAR; INTRAVENOUS AS NEEDED
Status: DISCONTINUED | OUTPATIENT
Start: 2019-08-29 | End: 2019-08-29 | Stop reason: SURG

## 2019-08-29 RX ORDER — PROMETHAZINE HYDROCHLORIDE 25 MG/ML
6.25 INJECTION, SOLUTION INTRAMUSCULAR; INTRAVENOUS
Status: DISCONTINUED | OUTPATIENT
Start: 2019-08-29 | End: 2019-08-29 | Stop reason: HOSPADM

## 2019-08-29 RX ORDER — ONDANSETRON 2 MG/ML
INJECTION INTRAMUSCULAR; INTRAVENOUS AS NEEDED
Status: DISCONTINUED | OUTPATIENT
Start: 2019-08-29 | End: 2019-08-29 | Stop reason: SURG

## 2019-08-29 RX ORDER — HYDRALAZINE HYDROCHLORIDE 20 MG/ML
INJECTION INTRAMUSCULAR; INTRAVENOUS AS NEEDED
Status: DISCONTINUED | OUTPATIENT
Start: 2019-08-29 | End: 2019-08-29 | Stop reason: SURG

## 2019-08-29 RX ORDER — CLINDAMYCIN PHOSPHATE 900 MG/50ML
900 INJECTION INTRAVENOUS EVERY 8 HOURS
Status: COMPLETED | OUTPATIENT
Start: 2019-08-29 | End: 2019-08-30

## 2019-08-29 RX ORDER — SODIUM CHLORIDE 0.9 % (FLUSH) 0.9 %
3 SYRINGE (ML) INJECTION EVERY 12 HOURS SCHEDULED
Status: DISCONTINUED | OUTPATIENT
Start: 2019-08-29 | End: 2019-08-29 | Stop reason: HOSPADM

## 2019-08-29 RX ORDER — HYDRALAZINE HYDROCHLORIDE 20 MG/ML
10 INJECTION INTRAMUSCULAR; INTRAVENOUS EVERY 4 HOURS PRN
Status: DISCONTINUED | OUTPATIENT
Start: 2019-08-29 | End: 2019-08-30 | Stop reason: HOSPADM

## 2019-08-29 RX ORDER — LIDOCAINE HYDROCHLORIDE 10 MG/ML
0.5 INJECTION, SOLUTION EPIDURAL; INFILTRATION; INTRACAUDAL; PERINEURAL ONCE AS NEEDED
Status: DISCONTINUED | OUTPATIENT
Start: 2019-08-29 | End: 2019-08-29 | Stop reason: HOSPADM

## 2019-08-29 RX ORDER — MIDAZOLAM HYDROCHLORIDE 1 MG/ML
1 INJECTION INTRAMUSCULAR; INTRAVENOUS
Status: DISCONTINUED | OUTPATIENT
Start: 2019-08-29 | End: 2019-08-29 | Stop reason: HOSPADM

## 2019-08-29 RX ORDER — AMLODIPINE BESYLATE 2.5 MG/1
2.5 TABLET ORAL DAILY
Status: DISCONTINUED | OUTPATIENT
Start: 2019-08-29 | End: 2019-08-30 | Stop reason: HOSPADM

## 2019-08-29 RX ORDER — FLUMAZENIL 0.1 MG/ML
0.2 INJECTION INTRAVENOUS AS NEEDED
Status: DISCONTINUED | OUTPATIENT
Start: 2019-08-29 | End: 2019-08-29 | Stop reason: HOSPADM

## 2019-08-29 RX ORDER — DULOXETIN HYDROCHLORIDE 60 MG/1
60 CAPSULE, DELAYED RELEASE ORAL NIGHTLY
Status: DISCONTINUED | OUTPATIENT
Start: 2019-08-29 | End: 2019-08-30 | Stop reason: HOSPADM

## 2019-08-29 RX ORDER — DEXAMETHASONE SODIUM PHOSPHATE 10 MG/ML
INJECTION INTRAMUSCULAR; INTRAVENOUS AS NEEDED
Status: DISCONTINUED | OUTPATIENT
Start: 2019-08-29 | End: 2019-08-29 | Stop reason: SURG

## 2019-08-29 RX ORDER — CLOPIDOGREL BISULFATE 75 MG/1
75 TABLET ORAL DAILY
Status: DISCONTINUED | OUTPATIENT
Start: 2019-08-29 | End: 2019-08-30 | Stop reason: HOSPADM

## 2019-08-29 RX ORDER — PROPOFOL 10 MG/ML
VIAL (ML) INTRAVENOUS AS NEEDED
Status: DISCONTINUED | OUTPATIENT
Start: 2019-08-29 | End: 2019-08-29 | Stop reason: SURG

## 2019-08-29 RX ORDER — LIDOCAINE HYDROCHLORIDE 10 MG/ML
INJECTION, SOLUTION EPIDURAL; INFILTRATION; INTRACAUDAL; PERINEURAL AS NEEDED
Status: DISCONTINUED | OUTPATIENT
Start: 2019-08-29 | End: 2019-08-29 | Stop reason: HOSPADM

## 2019-08-29 RX ORDER — ACETAMINOPHEN 325 MG/1
650 TABLET ORAL ONCE AS NEEDED
Status: DISCONTINUED | OUTPATIENT
Start: 2019-08-29 | End: 2019-08-29 | Stop reason: HOSPADM

## 2019-08-29 RX ORDER — MIDAZOLAM HYDROCHLORIDE 1 MG/ML
2 INJECTION INTRAMUSCULAR; INTRAVENOUS
Status: DISCONTINUED | OUTPATIENT
Start: 2019-08-29 | End: 2019-08-29 | Stop reason: HOSPADM

## 2019-08-29 RX ORDER — LANOLIN ALCOHOL/MO/W.PET/CERES
1000 CREAM (GRAM) TOPICAL NIGHTLY
COMMUNITY

## 2019-08-29 RX ORDER — EPHEDRINE SULFATE 50 MG/ML
INJECTION, SOLUTION INTRAVENOUS AS NEEDED
Status: DISCONTINUED | OUTPATIENT
Start: 2019-08-29 | End: 2019-08-29 | Stop reason: SURG

## 2019-08-29 RX ORDER — HYDROCODONE BITARTRATE AND ACETAMINOPHEN 5; 325 MG/1; MG/1
1 TABLET ORAL EVERY 4 HOURS PRN
Status: DISCONTINUED | OUTPATIENT
Start: 2019-08-29 | End: 2019-08-30 | Stop reason: HOSPADM

## 2019-08-29 RX ORDER — CLINDAMYCIN PHOSPHATE 900 MG/50ML
900 INJECTION INTRAVENOUS ONCE
Status: COMPLETED | OUTPATIENT
Start: 2019-08-29 | End: 2019-08-29

## 2019-08-29 RX ORDER — PROMETHAZINE HYDROCHLORIDE 25 MG/1
25 TABLET ORAL ONCE AS NEEDED
Status: DISCONTINUED | OUTPATIENT
Start: 2019-08-29 | End: 2019-08-29 | Stop reason: HOSPADM

## 2019-08-29 RX ORDER — HYDRALAZINE HYDROCHLORIDE 20 MG/ML
5 INJECTION INTRAMUSCULAR; INTRAVENOUS
Status: DISCONTINUED | OUTPATIENT
Start: 2019-08-29 | End: 2019-08-29 | Stop reason: HOSPADM

## 2019-08-29 RX ORDER — ATORVASTATIN CALCIUM 20 MG/1
20 TABLET, FILM COATED ORAL NIGHTLY
Status: DISCONTINUED | OUTPATIENT
Start: 2019-08-29 | End: 2019-08-30 | Stop reason: HOSPADM

## 2019-08-29 RX ORDER — NALOXONE HCL 0.4 MG/ML
0.2 VIAL (ML) INJECTION AS NEEDED
Status: DISCONTINUED | OUTPATIENT
Start: 2019-08-29 | End: 2019-08-29 | Stop reason: HOSPADM

## 2019-08-29 RX ORDER — PROTAMINE SULFATE 10 MG/ML
INJECTION, SOLUTION INTRAVENOUS AS NEEDED
Status: DISCONTINUED | OUTPATIENT
Start: 2019-08-29 | End: 2019-08-29 | Stop reason: SURG

## 2019-08-29 RX ORDER — PROMETHAZINE HYDROCHLORIDE 25 MG/1
25 SUPPOSITORY RECTAL ONCE AS NEEDED
Status: DISCONTINUED | OUTPATIENT
Start: 2019-08-29 | End: 2019-08-29 | Stop reason: HOSPADM

## 2019-08-29 RX ORDER — ASPIRIN 81 MG/1
81 TABLET ORAL DAILY
Status: DISCONTINUED | OUTPATIENT
Start: 2019-08-29 | End: 2019-08-30 | Stop reason: HOSPADM

## 2019-08-29 RX ORDER — PROMETHAZINE HYDROCHLORIDE 25 MG/ML
12.5 INJECTION, SOLUTION INTRAMUSCULAR; INTRAVENOUS ONCE AS NEEDED
Status: DISCONTINUED | OUTPATIENT
Start: 2019-08-29 | End: 2019-08-29 | Stop reason: HOSPADM

## 2019-08-29 RX ORDER — HYDROMORPHONE HYDROCHLORIDE 1 MG/ML
0.5 INJECTION, SOLUTION INTRAMUSCULAR; INTRAVENOUS; SUBCUTANEOUS
Status: DISCONTINUED | OUTPATIENT
Start: 2019-08-29 | End: 2019-08-29 | Stop reason: HOSPADM

## 2019-08-29 RX ORDER — NITROGLYCERIN 0.4 MG/1
0.4 TABLET SUBLINGUAL
Status: DISCONTINUED | OUTPATIENT
Start: 2019-08-29 | End: 2019-08-30 | Stop reason: HOSPADM

## 2019-08-29 RX ORDER — VARENICLINE TARTRATE 1 MG/1
1 TABLET, FILM COATED ORAL 2 TIMES DAILY
COMMUNITY
End: 2021-11-24

## 2019-08-29 RX ORDER — EPHEDRINE SULFATE 50 MG/ML
5 INJECTION, SOLUTION INTRAVENOUS ONCE AS NEEDED
Status: DISCONTINUED | OUTPATIENT
Start: 2019-08-29 | End: 2019-08-29 | Stop reason: HOSPADM

## 2019-08-29 RX ORDER — ASPIRIN 81 MG/1
81 TABLET ORAL DAILY
COMMUNITY
End: 2019-08-30 | Stop reason: HOSPADM

## 2019-08-29 RX ORDER — ONDANSETRON 2 MG/ML
4 INJECTION INTRAMUSCULAR; INTRAVENOUS ONCE AS NEEDED
Status: DISCONTINUED | OUTPATIENT
Start: 2019-08-29 | End: 2019-08-29 | Stop reason: HOSPADM

## 2019-08-29 RX ORDER — DIPHENHYDRAMINE HYDROCHLORIDE 50 MG/ML
12.5 INJECTION INTRAMUSCULAR; INTRAVENOUS
Status: DISCONTINUED | OUTPATIENT
Start: 2019-08-29 | End: 2019-08-29 | Stop reason: HOSPADM

## 2019-08-29 RX ORDER — LIDOCAINE HYDROCHLORIDE 20 MG/ML
INJECTION, SOLUTION INFILTRATION; PERINEURAL AS NEEDED
Status: DISCONTINUED | OUTPATIENT
Start: 2019-08-29 | End: 2019-08-29 | Stop reason: SURG

## 2019-08-29 RX ORDER — LISINOPRIL 10 MG/1
10 TABLET ORAL
Status: DISCONTINUED | OUTPATIENT
Start: 2019-08-29 | End: 2019-08-30 | Stop reason: HOSPADM

## 2019-08-29 RX ORDER — DIPHENHYDRAMINE HCL 25 MG
25 CAPSULE ORAL
Status: DISCONTINUED | OUTPATIENT
Start: 2019-08-29 | End: 2019-08-29 | Stop reason: HOSPADM

## 2019-08-29 RX ORDER — SODIUM CHLORIDE, SODIUM LACTATE, POTASSIUM CHLORIDE, CALCIUM CHLORIDE 600; 310; 30; 20 MG/100ML; MG/100ML; MG/100ML; MG/100ML
9 INJECTION, SOLUTION INTRAVENOUS CONTINUOUS
Status: DISCONTINUED | OUTPATIENT
Start: 2019-08-29 | End: 2019-08-29

## 2019-08-29 RX ORDER — SODIUM CHLORIDE 0.9 % (FLUSH) 0.9 %
3-10 SYRINGE (ML) INJECTION AS NEEDED
Status: DISCONTINUED | OUTPATIENT
Start: 2019-08-29 | End: 2019-08-29 | Stop reason: HOSPADM

## 2019-08-29 RX ORDER — LABETALOL HYDROCHLORIDE 5 MG/ML
5 INJECTION, SOLUTION INTRAVENOUS
Status: DISCONTINUED | OUTPATIENT
Start: 2019-08-29 | End: 2019-08-29 | Stop reason: HOSPADM

## 2019-08-29 RX ORDER — OXYCODONE AND ACETAMINOPHEN 7.5; 325 MG/1; MG/1
1 TABLET ORAL ONCE AS NEEDED
Status: DISCONTINUED | OUTPATIENT
Start: 2019-08-29 | End: 2019-08-29 | Stop reason: HOSPADM

## 2019-08-29 RX ADMIN — LIDOCAINE HYDROCHLORIDE 100 MG: 20 INJECTION, SOLUTION INFILTRATION; PERINEURAL at 07:33

## 2019-08-29 RX ADMIN — HYDROCODONE BITARTRATE AND ACETAMINOPHEN 1 TABLET: 5; 325 TABLET ORAL at 17:41

## 2019-08-29 RX ADMIN — DEXAMETHASONE SODIUM PHOSPHATE 8 MG: 10 INJECTION INTRAMUSCULAR; INTRAVENOUS at 07:40

## 2019-08-29 RX ADMIN — HYDROCODONE BITARTRATE AND ACETAMINOPHEN 1 TABLET: 5; 325 TABLET ORAL at 13:38

## 2019-08-29 RX ADMIN — PHENYLEPHRINE HYDROCHLORIDE 50 MCG: 10 INJECTION INTRAVENOUS at 08:42

## 2019-08-29 RX ADMIN — ATORVASTATIN CALCIUM 20 MG: 20 TABLET, FILM COATED ORAL at 20:16

## 2019-08-29 RX ADMIN — HYDROMORPHONE HYDROCHLORIDE 0.5 MG: 1 INJECTION, SOLUTION INTRAMUSCULAR; INTRAVENOUS; SUBCUTANEOUS at 10:40

## 2019-08-29 RX ADMIN — SODIUM CHLORIDE, POTASSIUM CHLORIDE, SODIUM LACTATE AND CALCIUM CHLORIDE 9 ML/HR: 600; 310; 30; 20 INJECTION, SOLUTION INTRAVENOUS at 10:51

## 2019-08-29 RX ADMIN — EPHEDRINE SULFATE 10 MG: 50 INJECTION INTRAMUSCULAR; INTRAVENOUS; SUBCUTANEOUS at 07:36

## 2019-08-29 RX ADMIN — FENTANYL CITRATE 50 MCG: 50 INJECTION INTRAMUSCULAR; INTRAVENOUS at 06:51

## 2019-08-29 RX ADMIN — FENTANYL CITRATE 50 MCG: 50 INJECTION INTRAMUSCULAR; INTRAVENOUS at 10:18

## 2019-08-29 RX ADMIN — ROCURONIUM BROMIDE 10 MG: 10 INJECTION INTRAVENOUS at 08:57

## 2019-08-29 RX ADMIN — FENTANYL CITRATE 50 MCG: 50 INJECTION INTRAMUSCULAR; INTRAVENOUS at 10:05

## 2019-08-29 RX ADMIN — CLOPIDOGREL 75 MG: 75 TABLET, FILM COATED ORAL at 13:35

## 2019-08-29 RX ADMIN — FENTANYL CITRATE 100 MCG: 50 INJECTION INTRAMUSCULAR; INTRAVENOUS at 07:33

## 2019-08-29 RX ADMIN — GLYCOPYRROLATE 0.1 MG: 0.2 INJECTION INTRAMUSCULAR; INTRAVENOUS at 07:32

## 2019-08-29 RX ADMIN — HYDROMORPHONE HYDROCHLORIDE 0.5 MG: 1 INJECTION, SOLUTION INTRAMUSCULAR; INTRAVENOUS; SUBCUTANEOUS at 10:14

## 2019-08-29 RX ADMIN — MIDAZOLAM 1 MG: 1 INJECTION INTRAMUSCULAR; INTRAVENOUS at 06:50

## 2019-08-29 RX ADMIN — ROCURONIUM BROMIDE 50 MG: 10 INJECTION INTRAVENOUS at 07:33

## 2019-08-29 RX ADMIN — DULOXETINE HYDROCHLORIDE 60 MG: 60 CAPSULE, DELAYED RELEASE ORAL at 20:16

## 2019-08-29 RX ADMIN — SODIUM CHLORIDE, POTASSIUM CHLORIDE, SODIUM LACTATE AND CALCIUM CHLORIDE 9 ML/HR: 600; 310; 30; 20 INJECTION, SOLUTION INTRAVENOUS at 06:51

## 2019-08-29 RX ADMIN — VARENICLINE TARTRATE 1 MG: 0.5 TABLET, FILM COATED ORAL at 17:12

## 2019-08-29 RX ADMIN — HEPARIN SODIUM 10000 UNITS: 1000 INJECTION, SOLUTION INTRAVENOUS; SUBCUTANEOUS at 08:12

## 2019-08-29 RX ADMIN — LIDOCAINE HYDROCHLORIDE 50 MG: 20 INJECTION, SOLUTION INFILTRATION; PERINEURAL at 09:17

## 2019-08-29 RX ADMIN — HYDROCODONE BITARTRATE AND ACETAMINOPHEN 1 TABLET: 5; 325 TABLET ORAL at 21:59

## 2019-08-29 RX ADMIN — PROPOFOL 200 MG: 10 INJECTION, EMULSION INTRAVENOUS at 07:33

## 2019-08-29 RX ADMIN — LISINOPRIL 10 MG: 10 TABLET ORAL at 13:34

## 2019-08-29 RX ADMIN — HYDROMORPHONE HYDROCHLORIDE 0.5 MG: 1 INJECTION, SOLUTION INTRAMUSCULAR; INTRAVENOUS; SUBCUTANEOUS at 10:28

## 2019-08-29 RX ADMIN — CLINDAMYCIN PHOSPHATE 900 MG: 900 INJECTION INTRAVENOUS at 07:41

## 2019-08-29 RX ADMIN — HYDROMORPHONE HYDROCHLORIDE 0.5 MG: 1 INJECTION, SOLUTION INTRAMUSCULAR; INTRAVENOUS; SUBCUTANEOUS at 10:54

## 2019-08-29 RX ADMIN — ONDANSETRON 4 MG: 2 INJECTION INTRAMUSCULAR; INTRAVENOUS at 09:15

## 2019-08-29 RX ADMIN — ASPIRIN 81 MG: 81 TABLET, COATED ORAL at 13:34

## 2019-08-29 RX ADMIN — ROCURONIUM BROMIDE 10 MG: 10 INJECTION INTRAVENOUS at 08:17

## 2019-08-29 RX ADMIN — PROTAMINE SULFATE 40 MG: 10 INJECTION, SOLUTION INTRAVENOUS at 09:06

## 2019-08-29 RX ADMIN — SUGAMMADEX 200 MG: 100 INJECTION, SOLUTION INTRAVENOUS at 09:15

## 2019-08-29 RX ADMIN — CLINDAMYCIN PHOSPHATE 900 MG: 900 INJECTION, SOLUTION INTRAVENOUS at 15:20

## 2019-08-29 RX ADMIN — EPHEDRINE SULFATE 15 MG: 50 INJECTION INTRAMUSCULAR; INTRAVENOUS; SUBCUTANEOUS at 08:26

## 2019-08-29 RX ADMIN — SODIUM CHLORIDE, POTASSIUM CHLORIDE, SODIUM LACTATE AND CALCIUM CHLORIDE: 600; 310; 30; 20 INJECTION, SOLUTION INTRAVENOUS at 08:59

## 2019-08-29 RX ADMIN — HYDRALAZINE HYDROCHLORIDE 10 MG: 20 INJECTION INTRAMUSCULAR; INTRAVENOUS at 09:25

## 2019-08-29 RX ADMIN — FAMOTIDINE 20 MG: 10 INJECTION INTRAVENOUS at 06:50

## 2019-08-29 RX ADMIN — HYDROCODONE BITARTRATE AND ACETAMINOPHEN 1 TABLET: 7.5; 325 TABLET ORAL at 10:39

## 2019-08-29 NOTE — ANESTHESIA POSTPROCEDURE EVALUATION
Patient: Aram Sarmiento    Procedure Summary     Date:  08/29/19 Room / Location:  [facility] OR [address] [place] [address] MAIN OR    Anesthesia Start:  0731 Anesthesia Stop:  0947    Procedure:  RIGHT CAROTID ENDARTERECTOMY (Right Neck) Diagnosis:      Surgeon:  Ed Veag MD Provider:  Juancarlos Griggs MD    Anesthesia Type:  general ASA Status:  4          Anesthesia Type: general  Last vitals  BP   128/60 (08/29/19 1040)   Temp   37.2 °C (98.9 °F) (08/29/19 0943)   Pulse   79 (08/29/19 1040)   Resp   16 (08/29/19 1040)     SpO2   93 % (08/29/19 1040)     Post Anesthesia Care and Evaluation    Patient location during evaluation: PACU  Patient participation: complete - patient participated  Level of consciousness: awake and alert  Pain score: 0  Pain management: adequate  Airway patency: patent  Anesthetic complications: No anesthetic complications    Cardiovascular status: acceptable  Respiratory status: acceptable  Hydration status: acceptable    Comments: /60   Pulse 79   Temp 37.2 °C (98.9 °F) (Oral)   Resp 16   Wt 102 kg (224 lb 5 oz)   SpO2 93%   BMI 32.19 kg/m²

## 2019-08-29 NOTE — ANESTHESIA PROCEDURE NOTES
Arterial Line      Patient location during procedure: holding area  Start time: 8/29/2019 6:49 AM  Stop Time:8/29/2019 6:55 AM       Line placed for ABGs/Labs/ISTAT and hemodynamic monitoring.  Performed By   Anesthesiologist: Juancarlos Griggs MD  Preanesthetic Checklist  Completed: patient identified, site marked, surgical consent, pre-op evaluation, timeout performed, IV checked, risks and benefits discussed and monitors and equipment checked  Arterial Line Prep   Sterile Tech: cap and gloves  Prep: ChloraPrep  Patient monitoring: blood pressure monitoring, continuous pulse oximetry and EKG  Arterial Line Procedure   Laterality:left  Location:  radial artery  Catheter size: 20 G   Guidance: ultrasound guided  PROCEDURE NOTE/ULTRASOUND INTERPRETATION.  Using ultrasound guidance the potential vascular sites for insertion of the catheter were visualized to determine the patency of the vessel to be used for vascular access.  After selecting the appropriate site for insertion, the needle was visualized under ultrasound being inserted into the radial artery, followed by ultrasound confirmation of wire and catheter placement. There were no abnormalities seen on ultrasound; an image was taken; and the patient tolerated the procedure with no complications.   Number of attempts: 1  Successful placement: yes  Post Assessment   Dressing Type: occlusive dressing applied, secured with tape and wrist guard applied.   Complications no  Circ/Move/Sens Assessment: normal.   Patient Tolerance: patient tolerated the procedure well with no apparent complications  Additional Notes  Ultrasound interpretation:  With ultrasound guidance, the available vessels were examined and the artery was found to be patent.  The needle and wire were seen entering the artery and the catheter was confirmed in the artery.  No abnormalities noted.

## 2019-08-29 NOTE — ANESTHESIA PROCEDURE NOTES
Airway  Urgency: elective    Airway not difficult    General Information and Staff    Patient location during procedure: OR  CRNA: Nino Ambrose CRNA    Indications and Patient Condition  Indications for airway management: airway protection    Preoxygenated: yes  MILS maintained throughout  Mask difficulty assessment: 1 - vent by mask    Final Airway Details  Final airway type: endotracheal airway      Successful airway: ETT    Successful intubation technique: direct laryngoscopy  Blade: Carlos  Blade size: 3  ETT size (mm): 7.0  Cormack-Lehane Classification: grade I - full view of glottis  Placement verified by: chest auscultation   Measured from: teeth  ETT to teeth (cm): 21  Number of attempts at approach: 1

## 2019-08-29 NOTE — ANESTHESIA PREPROCEDURE EVALUATION
Anesthesia Evaluation     Patient summary reviewed and Nursing notes reviewed   NPO Solid Status: > 8 hours  NPO Liquid Status: > 2 hours           Airway   Mallampati: II  TM distance: >3 FB  Neck ROM: full  Dental - normal exam     Pulmonary - normal exam    breath sounds clear to auscultation  (+) a smoker Current,   Cardiovascular - normal exam    ECG reviewed  Rhythm: regular  Rate: normal    (+) hypertension 2 medications or greater, valvular problems/murmurs murmur, past MI , CAD, hyperlipidemia,  carotid artery disease right carotid  (-) angina, orthopnea, PND, PAGE    ROS comment: · Normal left ventricular cavity size and wall thickness noted  · Left ventricular systolic function is normal.  · Left ventricular systolic function is normal. Calculated EF = 61%  · All left ventricular wall segments contract normally  · Left ventricular diastolic dysfunction is noted (grade I) consistent with impaired relaxation.    Neuro/Psych  (+) TIA, CVA residual symptoms, weakness,     GI/Hepatic/Renal/Endo - negative ROS     Musculoskeletal     (+) chronic pain, myalgias (Fibromyalgia),   Abdominal    Substance History - negative use     OB/GYN negative ob/gyn ROS         Other - negative ROS                       Anesthesia Plan    ASA 4     general   (A-line)  intravenous induction   Anesthetic plan, all risks, benefits, and alternatives have been provided, discussed and informed consent has been obtained with: patient.

## 2019-08-30 VITALS
BODY MASS INDEX: 32.11 KG/M2 | OXYGEN SATURATION: 95 % | TEMPERATURE: 97.8 F | DIASTOLIC BLOOD PRESSURE: 88 MMHG | SYSTOLIC BLOOD PRESSURE: 125 MMHG | HEART RATE: 55 BPM | WEIGHT: 224.31 LBS | RESPIRATION RATE: 16 BRPM | HEIGHT: 70 IN

## 2019-08-30 LAB
ACT BLD: 125 SECONDS (ref 82–152)
ACT BLD: 213 SECONDS (ref 82–152)
ACT BLD: 257 SECONDS (ref 82–152)

## 2019-08-30 RX ORDER — HYDROCODONE BITARTRATE AND ACETAMINOPHEN 5; 325 MG/1; MG/1
1 TABLET ORAL EVERY 4 HOURS PRN
Qty: 20 TABLET | Refills: 0 | Status: SHIPPED | OUTPATIENT
Start: 2019-08-30 | End: 2019-09-08

## 2019-08-30 RX ADMIN — VARENICLINE TARTRATE 1 MG: 0.5 TABLET, FILM COATED ORAL at 08:35

## 2019-08-30 RX ADMIN — AMLODIPINE BESYLATE 2.5 MG: 2.5 TABLET ORAL at 08:35

## 2019-08-30 RX ADMIN — CLINDAMYCIN PHOSPHATE 900 MG: 900 INJECTION, SOLUTION INTRAVENOUS at 00:14

## 2019-08-30 RX ADMIN — HYDROCODONE BITARTRATE AND ACETAMINOPHEN 1 TABLET: 5; 325 TABLET ORAL at 02:12

## 2019-08-30 RX ADMIN — HYDROCODONE BITARTRATE AND ACETAMINOPHEN 1 TABLET: 5; 325 TABLET ORAL at 06:35

## 2019-08-30 RX ADMIN — CLOPIDOGREL 75 MG: 75 TABLET, FILM COATED ORAL at 08:35

## 2020-01-14 ENCOUNTER — HOSPITAL ENCOUNTER (OUTPATIENT)
Dept: LAB | Facility: HOSPITAL | Age: 65
Discharge: HOME | End: 2020-01-14
Attending: INTERNAL MEDICINE

## 2020-01-14 LAB
ALBUMIN SERPL-MCNC: 4.1 G/DL (ref 3.5–5)
ALBUMIN/GLOB SERPL: 1.3 {RATIO} (ref 1.4–2.6)
ALP SERPL-CCNC: 73 U/L (ref 56–155)
ALT SERPL-CCNC: 43 U/L (ref 10–40)
ANION GAP SERPL CALC-SCNC: 20 MMOL/L (ref 8–19)
AST SERPL-CCNC: 32 U/L (ref 15–50)
BASOPHILS # BLD AUTO: 0.06 10*3/UL (ref 0–0.2)
BASOPHILS NFR BLD AUTO: 0.9 % (ref 0–3)
BILIRUB SERPL-MCNC: 0.54 MG/DL (ref 0.2–1.3)
BUN SERPL-MCNC: 14 MG/DL (ref 5–25)
BUN/CREAT SERPL: 13 {RATIO} (ref 6–20)
CALCIUM SERPL-MCNC: 9.2 MG/DL (ref 8.7–10.4)
CHLORIDE SERPL-SCNC: 103 MMOL/L (ref 99–111)
CHOLEST SERPL-MCNC: 131 MG/DL (ref 107–200)
CHOLEST/HDLC SERPL: 3.7 {RATIO} (ref 3–6)
CONV ABS IMM GRAN: 0.03 10*3/UL (ref 0–0.2)
CONV CO2: 22 MMOL/L (ref 22–32)
CONV IMMATURE GRAN: 0.5 % (ref 0–1.8)
CONV TOTAL PROTEIN: 7.2 G/DL (ref 6.3–8.2)
CREAT UR-MCNC: 1.05 MG/DL (ref 0.7–1.2)
DEPRECATED RDW RBC AUTO: 46.4 FL (ref 35.1–43.9)
EOSINOPHIL # BLD AUTO: 0.27 10*3/UL (ref 0–0.7)
EOSINOPHIL # BLD AUTO: 4.2 % (ref 0–7)
ERYTHROCYTE [DISTWIDTH] IN BLOOD BY AUTOMATED COUNT: 13.1 % (ref 11.6–14.4)
EST. AVERAGE GLUCOSE BLD GHB EST-MCNC: 128 MG/DL
GFR SERPLBLD BASED ON 1.73 SQ M-ARVRAT: >60 ML/MIN/{1.73_M2}
GLOBULIN UR ELPH-MCNC: 3.1 G/DL (ref 2–3.5)
GLUCOSE SERPL-MCNC: 96 MG/DL (ref 70–99)
HBA1C MFR BLD: 6.1 % (ref 3.5–5.7)
HCT VFR BLD AUTO: 44.5 % (ref 42–52)
HDLC SERPL-MCNC: 35 MG/DL (ref 40–60)
HGB BLD-MCNC: 15.1 G/DL (ref 14–18)
LDLC SERPL CALC-MCNC: 69 MG/DL (ref 70–100)
LYMPHOCYTES # BLD AUTO: 1.53 10*3/UL (ref 1–5)
LYMPHOCYTES NFR BLD AUTO: 23.7 % (ref 20–45)
MCH RBC QN AUTO: 33.1 PG (ref 27–31)
MCHC RBC AUTO-ENTMCNC: 33.9 G/DL (ref 33–37)
MCV RBC AUTO: 97.6 FL (ref 80–96)
MONOCYTES # BLD AUTO: 0.49 10*3/UL (ref 0.2–1.2)
MONOCYTES NFR BLD AUTO: 7.6 % (ref 3–10)
NEUTROPHILS # BLD AUTO: 4.07 10*3/UL (ref 2–8)
NEUTROPHILS NFR BLD AUTO: 63.1 % (ref 30–85)
NRBC CBCN: 0 % (ref 0–0.7)
OSMOLALITY SERPL CALC.SUM OF ELEC: 292 MOSM/KG (ref 273–304)
PLATELET # BLD AUTO: 258 10*3/UL (ref 130–400)
PMV BLD AUTO: 10.1 FL (ref 9.4–12.4)
POTASSIUM SERPL-SCNC: 4.3 MMOL/L (ref 3.5–5.3)
PSA SERPL-MCNC: 2.13 NG/ML (ref 0–4)
RBC # BLD AUTO: 4.56 10*6/UL (ref 4.7–6.1)
SODIUM SERPL-SCNC: 141 MMOL/L (ref 135–147)
TRIGL SERPL-MCNC: 137 MG/DL (ref 40–150)
VLDLC SERPL-MCNC: 27 MG/DL (ref 5–37)
WBC # BLD AUTO: 6.45 10*3/UL (ref 4.8–10.8)

## 2020-07-14 ENCOUNTER — HOSPITAL ENCOUNTER (OUTPATIENT)
Dept: LAB | Facility: HOSPITAL | Age: 65
Discharge: HOME | End: 2020-07-14
Attending: INTERNAL MEDICINE

## 2020-07-14 LAB
ALBUMIN SERPL-MCNC: 4.1 G/DL (ref 3.5–5)
ALBUMIN/GLOB SERPL: 1.4 {RATIO} (ref 1.4–2.6)
ALP SERPL-CCNC: 66 U/L (ref 56–155)
ALT SERPL-CCNC: 28 U/L (ref 10–40)
ANION GAP SERPL CALC-SCNC: 18 MMOL/L (ref 8–19)
AST SERPL-CCNC: 23 U/L (ref 15–50)
BASOPHILS # BLD AUTO: 0.05 10*3/UL (ref 0–0.2)
BASOPHILS NFR BLD AUTO: 0.8 % (ref 0–3)
BILIRUB SERPL-MCNC: 0.53 MG/DL (ref 0.2–1.3)
BUN SERPL-MCNC: 23 MG/DL (ref 5–25)
BUN/CREAT SERPL: 20 {RATIO} (ref 6–20)
CALCIUM SERPL-MCNC: 9.1 MG/DL (ref 8.7–10.4)
CHLORIDE SERPL-SCNC: 103 MMOL/L (ref 99–111)
CHOLEST SERPL-MCNC: 136 MG/DL (ref 107–200)
CHOLEST/HDLC SERPL: 3.8 {RATIO} (ref 3–6)
CONV ABS IMM GRAN: 0.02 10*3/UL (ref 0–0.2)
CONV CO2: 21 MMOL/L (ref 22–32)
CONV IMMATURE GRAN: 0.3 % (ref 0–1.8)
CONV TOTAL PROTEIN: 7 G/DL (ref 6.3–8.2)
CREAT UR-MCNC: 1.14 MG/DL (ref 0.7–1.2)
DEPRECATED RDW RBC AUTO: 51.6 FL (ref 35.1–43.9)
EOSINOPHIL # BLD AUTO: 0.34 10*3/UL (ref 0–0.7)
EOSINOPHIL # BLD AUTO: 5.5 % (ref 0–7)
ERYTHROCYTE [DISTWIDTH] IN BLOOD BY AUTOMATED COUNT: 13.6 % (ref 11.6–14.4)
EST. AVERAGE GLUCOSE BLD GHB EST-MCNC: 126 MG/DL
GFR SERPLBLD BASED ON 1.73 SQ M-ARVRAT: >60 ML/MIN/{1.73_M2}
GLOBULIN UR ELPH-MCNC: 2.9 G/DL (ref 2–3.5)
GLUCOSE SERPL-MCNC: 87 MG/DL (ref 70–99)
HBA1C MFR BLD: 6 % (ref 3.5–5.7)
HCT VFR BLD AUTO: 46.6 % (ref 42–52)
HDLC SERPL-MCNC: 36 MG/DL (ref 40–60)
HGB BLD-MCNC: 15.4 G/DL (ref 14–18)
LDLC SERPL CALC-MCNC: 58 MG/DL (ref 70–100)
LYMPHOCYTES # BLD AUTO: 1.53 10*3/UL (ref 1–5)
LYMPHOCYTES NFR BLD AUTO: 24.8 % (ref 20–45)
MCH RBC QN AUTO: 33.7 PG (ref 27–31)
MCHC RBC AUTO-ENTMCNC: 33 G/DL (ref 33–37)
MCV RBC AUTO: 102 FL (ref 80–96)
MONOCYTES # BLD AUTO: 0.43 10*3/UL (ref 0.2–1.2)
MONOCYTES NFR BLD AUTO: 7 % (ref 3–10)
NEUTROPHILS # BLD AUTO: 3.79 10*3/UL (ref 2–8)
NEUTROPHILS NFR BLD AUTO: 61.6 % (ref 30–85)
NRBC CBCN: 0 % (ref 0–0.7)
OSMOLALITY SERPL CALC.SUM OF ELEC: 287 MOSM/KG (ref 273–304)
PLATELET # BLD AUTO: 237 10*3/UL (ref 130–400)
PMV BLD AUTO: 10.7 FL (ref 9.4–12.4)
POTASSIUM SERPL-SCNC: 4.5 MMOL/L (ref 3.5–5.3)
RBC # BLD AUTO: 4.57 10*6/UL (ref 4.7–6.1)
SODIUM SERPL-SCNC: 137 MMOL/L (ref 135–147)
TESTOST SERPL-MCNC: 142 NG/DL (ref 193–740)
TRIGL SERPL-MCNC: 209 MG/DL (ref 40–150)
TSH SERPL-ACNC: 1.81 M[IU]/L (ref 0.27–4.2)
VLDLC SERPL-MCNC: 42 MG/DL (ref 5–37)
WBC # BLD AUTO: 6.16 10*3/UL (ref 4.8–10.8)

## 2021-01-14 ENCOUNTER — HOSPITAL ENCOUNTER (OUTPATIENT)
Dept: LAB | Facility: HOSPITAL | Age: 66
Discharge: HOME | End: 2021-01-14
Attending: INTERNAL MEDICINE

## 2021-01-14 LAB
BASOPHILS # BLD AUTO: 0.06 10*3/UL (ref 0–0.2)
BASOPHILS NFR BLD AUTO: 1 % (ref 0–3)
CHOLEST SERPL-MCNC: 132 MG/DL (ref 107–200)
CHOLEST/HDLC SERPL: 3.5 {RATIO} (ref 3–6)
CONV ABS IMM GRAN: 0.03 10*3/UL (ref 0–0.2)
CONV IMMATURE GRAN: 0.5 % (ref 0–1.8)
DEPRECATED RDW RBC AUTO: 48.8 FL (ref 35.1–43.9)
EOSINOPHIL # BLD AUTO: 0.35 10*3/UL (ref 0–0.7)
EOSINOPHIL # BLD AUTO: 5.5 % (ref 0–7)
ERYTHROCYTE [DISTWIDTH] IN BLOOD BY AUTOMATED COUNT: 13.2 % (ref 11.6–14.4)
EST. AVERAGE GLUCOSE BLD GHB EST-MCNC: 126 MG/DL
HBA1C MFR BLD: 6 % (ref 3.5–5.7)
HCT VFR BLD AUTO: 46.8 % (ref 42–52)
HDLC SERPL-MCNC: 38 MG/DL (ref 40–60)
HGB BLD-MCNC: 15.6 G/DL (ref 14–18)
LDLC SERPL CALC-MCNC: 68 MG/DL (ref 70–100)
LYMPHOCYTES # BLD AUTO: 1.67 10*3/UL (ref 1–5)
LYMPHOCYTES NFR BLD AUTO: 26.5 % (ref 20–45)
MCH RBC QN AUTO: 33 PG (ref 27–31)
MCHC RBC AUTO-ENTMCNC: 33.3 G/DL (ref 33–37)
MCV RBC AUTO: 98.9 FL (ref 80–96)
MONOCYTES # BLD AUTO: 0.41 10*3/UL (ref 0.2–1.2)
MONOCYTES NFR BLD AUTO: 6.5 % (ref 3–10)
NEUTROPHILS # BLD AUTO: 3.79 10*3/UL (ref 2–8)
NEUTROPHILS NFR BLD AUTO: 60 % (ref 30–85)
NRBC CBCN: 0 % (ref 0–0.7)
PLATELET # BLD AUTO: 226 10*3/UL (ref 130–400)
PMV BLD AUTO: 10.2 FL (ref 9.4–12.4)
RBC # BLD AUTO: 4.73 10*6/UL (ref 4.7–6.1)
TRIGL SERPL-MCNC: 131 MG/DL (ref 40–150)
VLDLC SERPL-MCNC: 26 MG/DL (ref 5–37)
WBC # BLD AUTO: 6.31 10*3/UL (ref 4.8–10.8)

## 2021-03-16 ENCOUNTER — HOSPITAL ENCOUNTER (OUTPATIENT)
Dept: VACCINE CLINIC | Facility: HOSPITAL | Age: 66
Discharge: HOME | End: 2021-03-16
Attending: INTERNAL MEDICINE

## 2021-04-06 ENCOUNTER — HOSPITAL ENCOUNTER (OUTPATIENT)
Dept: VACCINE CLINIC | Facility: HOSPITAL | Age: 66
Discharge: HOME | End: 2021-04-06
Attending: INTERNAL MEDICINE

## 2021-07-08 DIAGNOSIS — E78.2 MIXED HYPERLIPIDEMIA: Primary | ICD-10-CM

## 2021-07-08 RX ORDER — ATORVASTATIN CALCIUM 20 MG/1
20 TABLET, FILM COATED ORAL NIGHTLY
Qty: 90 TABLET | Refills: 0 | Status: SHIPPED | OUTPATIENT
Start: 2021-07-08 | End: 2021-11-17

## 2021-07-22 ENCOUNTER — TRANSCRIBE ORDERS (OUTPATIENT)
Dept: LAB | Facility: HOSPITAL | Age: 66
End: 2021-07-22

## 2021-07-22 ENCOUNTER — LAB (OUTPATIENT)
Dept: LAB | Facility: HOSPITAL | Age: 66
End: 2021-07-22

## 2021-07-22 DIAGNOSIS — R73.01 IMPAIRED FASTING GLUCOSE: ICD-10-CM

## 2021-07-22 DIAGNOSIS — E78.2 MIXED HYPERLIPIDEMIA: ICD-10-CM

## 2021-07-22 DIAGNOSIS — E53.8 VITAMIN B12 DEFICIENCY (NON ANEMIC): ICD-10-CM

## 2021-07-22 DIAGNOSIS — E55.9 VITAMIN D DEFICIENCY DISEASE: ICD-10-CM

## 2021-07-22 DIAGNOSIS — Z12.5 SCREENING FOR PROSTATE CANCER: ICD-10-CM

## 2021-07-22 DIAGNOSIS — R53.83 TIREDNESS: ICD-10-CM

## 2021-07-22 DIAGNOSIS — E78.2 MIXED HYPERLIPIDEMIA: Primary | ICD-10-CM

## 2021-07-22 DIAGNOSIS — E29.1 TESTICULAR FAILURE: ICD-10-CM

## 2021-07-22 LAB
BASOPHILS # BLD AUTO: 0.04 10*3/MM3 (ref 0–0.2)
BASOPHILS NFR BLD AUTO: 0.7 % (ref 0–1.5)
DEPRECATED RDW RBC AUTO: 50.1 FL (ref 37–54)
EOSINOPHIL # BLD AUTO: 0.36 10*3/MM3 (ref 0–0.4)
EOSINOPHIL NFR BLD AUTO: 5.9 % (ref 0.3–6.2)
ERYTHROCYTE [DISTWIDTH] IN BLOOD BY AUTOMATED COUNT: 13 % (ref 12.3–15.4)
HBA1C MFR BLD: 5.8 % (ref 4.8–5.6)
HCT VFR BLD AUTO: 48.8 % (ref 37.5–51)
HGB BLD-MCNC: 16.2 G/DL (ref 13–17.7)
IMM GRANULOCYTES # BLD AUTO: 0.02 10*3/MM3 (ref 0–0.05)
IMM GRANULOCYTES NFR BLD AUTO: 0.3 % (ref 0–0.5)
LYMPHOCYTES # BLD AUTO: 1.86 10*3/MM3 (ref 0.7–3.1)
LYMPHOCYTES NFR BLD AUTO: 30.6 % (ref 19.6–45.3)
MCH RBC QN AUTO: 34 PG (ref 26.6–33)
MCHC RBC AUTO-ENTMCNC: 33.2 G/DL (ref 31.5–35.7)
MCV RBC AUTO: 102.3 FL (ref 79–97)
MONOCYTES # BLD AUTO: 0.51 10*3/MM3 (ref 0.1–0.9)
MONOCYTES NFR BLD AUTO: 8.4 % (ref 5–12)
NEUTROPHILS NFR BLD AUTO: 3.28 10*3/MM3 (ref 1.7–7)
NEUTROPHILS NFR BLD AUTO: 54.1 % (ref 42.7–76)
NRBC BLD AUTO-RTO: 0 /100 WBC (ref 0–0.2)
PLATELET # BLD AUTO: 227 10*3/MM3 (ref 140–450)
PMV BLD AUTO: 10.5 FL (ref 6–12)
RBC # BLD AUTO: 4.77 10*6/MM3 (ref 4.14–5.8)
WBC # BLD AUTO: 6.07 10*3/MM3 (ref 3.4–10.8)

## 2021-07-22 PROCEDURE — 80053 COMPREHEN METABOLIC PANEL: CPT

## 2021-07-22 PROCEDURE — 84443 ASSAY THYROID STIM HORMONE: CPT

## 2021-07-22 PROCEDURE — 82306 VITAMIN D 25 HYDROXY: CPT

## 2021-07-22 PROCEDURE — 82607 VITAMIN B-12: CPT

## 2021-07-22 PROCEDURE — 83036 HEMOGLOBIN GLYCOSYLATED A1C: CPT

## 2021-07-22 PROCEDURE — 82746 ASSAY OF FOLIC ACID SERUM: CPT

## 2021-07-22 PROCEDURE — 84403 ASSAY OF TOTAL TESTOSTERONE: CPT

## 2021-07-22 PROCEDURE — 36415 COLL VENOUS BLD VENIPUNCTURE: CPT

## 2021-07-22 PROCEDURE — 85025 COMPLETE CBC W/AUTO DIFF WBC: CPT

## 2021-07-22 PROCEDURE — 80061 LIPID PANEL: CPT

## 2021-07-22 PROCEDURE — G0103 PSA SCREENING: HCPCS

## 2021-07-23 LAB
25(OH)D3 SERPL-MCNC: 18.9 NG/ML
ALBUMIN SERPL-MCNC: 4.1 G/DL (ref 3.5–5.2)
ALBUMIN/GLOB SERPL: 1.4 G/DL
ALP SERPL-CCNC: 70 U/L (ref 39–117)
ALT SERPL W P-5'-P-CCNC: 42 U/L (ref 1–41)
ANION GAP SERPL CALCULATED.3IONS-SCNC: 9.1 MMOL/L (ref 5–15)
AST SERPL-CCNC: 30 U/L (ref 1–40)
BILIRUB SERPL-MCNC: 0.2 MG/DL (ref 0–1.2)
BUN SERPL-MCNC: 17 MG/DL (ref 8–23)
BUN/CREAT SERPL: 15.7 (ref 7–25)
CALCIUM SPEC-SCNC: 9.1 MG/DL (ref 8.6–10.5)
CHLORIDE SERPL-SCNC: 105 MMOL/L (ref 98–107)
CHOLEST SERPL-MCNC: 147 MG/DL (ref 0–200)
CO2 SERPL-SCNC: 25.9 MMOL/L (ref 22–29)
CREAT SERPL-MCNC: 1.08 MG/DL (ref 0.76–1.27)
FOLATE SERPL-MCNC: 4.59 NG/ML (ref 4.78–24.2)
GFR SERPL CREATININE-BSD FRML MDRD: 68 ML/MIN/1.73
GLOBULIN UR ELPH-MCNC: 2.9 GM/DL
GLUCOSE SERPL-MCNC: 106 MG/DL (ref 65–99)
HDLC SERPL-MCNC: 30 MG/DL (ref 40–60)
LDLC SERPL CALC-MCNC: 85 MG/DL (ref 0–100)
LDLC/HDLC SERPL: 2.68 {RATIO}
POTASSIUM SERPL-SCNC: 4.9 MMOL/L (ref 3.5–5.2)
PROT SERPL-MCNC: 7 G/DL (ref 6–8.5)
PSA SERPL-MCNC: 1.67 NG/ML (ref 0–4)
SODIUM SERPL-SCNC: 140 MMOL/L (ref 136–145)
TESTOST SERPL-MCNC: 143 NG/DL (ref 193–740)
TRIGL SERPL-MCNC: 183 MG/DL (ref 0–150)
TSH SERPL DL<=0.05 MIU/L-ACNC: 2.41 UIU/ML (ref 0.27–4.2)
VIT B12 BLD-MCNC: 544 PG/ML (ref 211–946)
VLDLC SERPL-MCNC: 32 MG/DL (ref 5–40)

## 2021-07-27 PROBLEM — E29.1 HYPOGONADISM IN MALE: Status: ACTIVE | Noted: 2021-07-27

## 2021-07-28 ENCOUNTER — OFFICE VISIT (OUTPATIENT)
Dept: INTERNAL MEDICINE | Facility: CLINIC | Age: 66
End: 2021-07-28

## 2021-07-28 VITALS
BODY MASS INDEX: 32.78 KG/M2 | HEART RATE: 65 BPM | TEMPERATURE: 98.2 F | OXYGEN SATURATION: 93 % | SYSTOLIC BLOOD PRESSURE: 147 MMHG | HEIGHT: 70 IN | WEIGHT: 229 LBS | DIASTOLIC BLOOD PRESSURE: 83 MMHG

## 2021-07-28 DIAGNOSIS — Z86.73 H/O: CVA (CEREBROVASCULAR ACCIDENT): ICD-10-CM

## 2021-07-28 DIAGNOSIS — I65.21 STENOSIS OF RIGHT CAROTID ARTERY: ICD-10-CM

## 2021-07-28 DIAGNOSIS — E29.1 HYPOGONADISM IN MALE: ICD-10-CM

## 2021-07-28 DIAGNOSIS — I10 ESSENTIAL HYPERTENSION: Primary | ICD-10-CM

## 2021-07-28 DIAGNOSIS — E55.9 VITAMIN D DEFICIENCY: ICD-10-CM

## 2021-07-28 DIAGNOSIS — E78.2 MIXED HYPERLIPIDEMIA: ICD-10-CM

## 2021-07-28 DIAGNOSIS — R73.01 IFG (IMPAIRED FASTING GLUCOSE): ICD-10-CM

## 2021-07-28 DIAGNOSIS — I65.23 CAROTID STENOSIS, ASYMPTOMATIC, BILATERAL: ICD-10-CM

## 2021-07-28 PROCEDURE — 99213 OFFICE O/P EST LOW 20 MIN: CPT | Performed by: INTERNAL MEDICINE

## 2021-07-28 NOTE — PROGRESS NOTES
"Chief Complaint  Hyperlipidemia and Follow-up (with labs )    Subjective  f/u with labs, occ cp, no falls , breathing ok         Aram Sarmiento presents to [facility] INTERNAL MEDICINE      Objective   Vital Signs  Vitals:    07/28/21 1346   BP: 147/83   Pulse: 65   Temp: 98.2 °F (36.8 °C)   SpO2: 93%   Weight: 104 kg (229 lb)   Height: 177.8 cm (70\")    as above plus --  Review of Systems   Constitutional: Negative.    HENT: Negative.    Eyes: Negative.    Respiratory: Negative.    Cardiovascular: Negative.    Gastrointestinal: Negative.    Endocrine: Negative.    Genitourinary: Negative.    Musculoskeletal: Negative.    Allergic/Immunologic: Negative.    Neurological: Negative.    Hematological: Negative.    Psychiatric/Behavioral: Negative.       Physical Exam  Constitutional:       General: He is not in acute distress.     Appearance: Normal appearance.   HENT:      Head: Normocephalic.      Mouth/Throat:      Mouth: Mucous membranes are dry.   Eyes:      Conjunctiva/sclera: Conjunctivae normal.      Pupils: Pupils are equal, round, and reactive to light.   Cardiovascular:      Rate and Rhythm: Normal rate and regular rhythm.      Pulses: Normal pulses.      Heart sounds: Normal heart sounds.   Pulmonary:      Effort: Pulmonary effort is normal.      Breath sounds: Normal breath sounds.   Abdominal:      General: Abdomen is flat. Bowel sounds are normal.      Palpations: Abdomen is soft.   Musculoskeletal:         General: No swelling. Normal range of motion.      Cervical back: Neck supple.   Skin:     General: Skin is warm and dry.      Coloration: Skin is not jaundiced.   Neurological:      General: No focal deficit present.      Mental Status: He is alert and oriented to person, place, and time. Mental status is at baseline.   Psychiatric:         Mood and Affect: Mood normal.         Behavior: Behavior normal.         Thought Content: Thought content normal.         Judgment: Judgment " normal.          Result Review :   No results found for: PROBNP, BNP  CMP    CMP 7/22/21   Glucose 106 (A)   BUN 17   Creatinine 1.08   eGFR Non African Am 68   Sodium 140   Potassium 4.9   Chloride 105   Calcium 9.1   Albumin 4.10   Total Bilirubin 0.2   Alkaline Phosphatase 70   AST (SGOT) 30   ALT (SGPT) 42 (A)   (A) Abnormal value            CBC w/diff    CBC w/Diff 1/14/21 7/22/21   WBC 6.31 6.07   RBC 4.73 4.77   Hemoglobin 15.6 16.2   Hematocrit 46.8 48.8   MCV 98.9 (A) 102.3 (A)   MCH 33.0 (A) 34.0 (A)   MCHC 33.3 33.2   RDW 13.2 13.0   Platelets 226 227   Neutrophil Rel % 60.0 54.1   Immature Granulocyte Rel %  0.3   Lymphocyte Rel % 26.5 30.6   Monocyte Rel % 6.5 8.4   Eosinophil Rel % 5.5 5.9   Basophil Rel % 1.0 0.7   (A) Abnormal value             Lipid Panel    Lipid Panel 1/14/21 7/22/21   Total Cholesterol  147   Total Cholesterol 132    Triglycerides 131 183 (A)   HDL Cholesterol 38 (A) 30 (A)   VLDL Cholesterol 26 32   LDL Cholesterol  68 (A) 85   LDL/HDL Ratio  2.68   (A) Abnormal value       Comments are available for some flowsheets but are not being displayed.            Lab Results   Component Value Date    TSH 2.410 07/22/2021    TSH 1.810 07/14/2020      No results found for: FREET4   A1C Last 3 Results    HGBA1C Last 3 Results 1/14/21 7/22/21   Hemoglobin A1C 6.0 (A) 5.80 (A)   (A) Abnormal value       Comments are available for some flowsheets but are not being displayed.            PSA    PSA 7/22/21   PSA 1.670                          Assessment and Plan    Mild vitamin D, B12, folate deficiencies July 20, 2021 patient will start over-the-counter supplements    Impaired fasting glucose, hemoglobin A1c down slightly to 5.8 July 2021 continue exercise monitor diet,    FATIGUE, SLEEPINESS AFTER EATING , DISCUSSED POST PRANDIAL INSULIN RELEASE AND / OR SLEEP STUDY ? AND POOR SLEEP CYCLES, DISCUSSED HIGH PROTEIN INTAKE AND PROVIGIL, AND SLEEP STUDY, BETTER SLEEP HYGEINE , JULY  2020    Carotid stenosis right artery--80-99, -% feb 2018, - S/P R CEA surgery August 29, 2019 in [place] ----cardiac stress testing August 21 in [place] and echocardiogram------dr loren lowery at [facility]--plavix /asa---, Follow-up CAROTIDS jUNE 2020 WITH 50% STENOSIS, right-sided stable July 2020, Patient continues to follow-up with vascular surgery, stable recent carotid ultrasound January 2021    Low testosterone  Low again 143, July 2021, ----- options discussed, will wait on treatment for now. slight increase in PSA FROM 1.2-1.6-2.0, TO 2.1 JAN 2020, -- OVER THE PAST 2-3 YEARS,, PSA levels dropped to 1.6 July 28, 2021,     CAD, REMOTE MI--CHOL GOOD, --lipitor 20 mg qd --WATCH ETOH USE, DUE TO HIGH TRIG.    OLD STROKEs- 2004-MILD COG IMPAIRMENT, L UE WEAKNESS,? 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  Patient was given instructions and counseling regarding his condition or for health maintenance advice. Please see specific information pulled into the AVS if appropriate.

## 2021-09-17 RX ORDER — QUINAPRIL 40 MG/1
TABLET ORAL
Qty: 90 TABLET | Refills: 1 | Status: SHIPPED | OUTPATIENT
Start: 2021-09-17 | End: 2023-02-22 | Stop reason: SDUPTHER

## 2021-09-20 RX ORDER — DULOXETIN HYDROCHLORIDE 60 MG/1
CAPSULE, DELAYED RELEASE ORAL
Qty: 90 CAPSULE | Refills: 1 | Status: SHIPPED | OUTPATIENT
Start: 2021-09-20 | End: 2021-11-06

## 2021-11-06 RX ORDER — DULOXETIN HYDROCHLORIDE 60 MG/1
CAPSULE, DELAYED RELEASE ORAL
Qty: 90 CAPSULE | Refills: 1 | Status: SHIPPED | OUTPATIENT
Start: 2021-11-06 | End: 2023-01-24 | Stop reason: SDUPTHER

## 2021-11-16 ENCOUNTER — TELEPHONE (OUTPATIENT)
Dept: INTERNAL MEDICINE | Facility: CLINIC | Age: 66
End: 2021-11-16

## 2021-11-16 DIAGNOSIS — E78.2 MIXED HYPERLIPIDEMIA: ICD-10-CM

## 2021-11-16 NOTE — TELEPHONE ENCOUNTER
He would need to be seen but I do not have any openings the rest of the week since I am leaving town on Friday, make him an appointment with one of the nurse practitioners

## 2021-11-16 NOTE — TELEPHONE ENCOUNTER
Patient called and said he is having severe back pain affecting his walking and everyday activity. He also stated that he had a spinal fusion in the past but couldn't remember what year. He is wanting to know what would be the best route in getting his pain under control.

## 2021-11-17 RX ORDER — ATORVASTATIN CALCIUM 20 MG/1
20 TABLET, FILM COATED ORAL NIGHTLY
Qty: 90 TABLET | Refills: 1 | Status: SHIPPED | OUTPATIENT
Start: 2021-11-17 | End: 2022-06-15

## 2021-11-24 ENCOUNTER — HOSPITAL ENCOUNTER (OUTPATIENT)
Dept: GENERAL RADIOLOGY | Facility: HOSPITAL | Age: 66
Discharge: HOME | End: 2021-11-24
Admitting: NURSE PRACTITIONER

## 2021-11-24 ENCOUNTER — OFFICE VISIT (OUTPATIENT)
Dept: INTERNAL MEDICINE | Facility: CLINIC | Age: 66
End: 2021-11-24

## 2021-11-24 VITALS
HEIGHT: 70 IN | TEMPERATURE: 97.3 F | WEIGHT: 228 LBS | HEART RATE: 84 BPM | SYSTOLIC BLOOD PRESSURE: 147 MMHG | BODY MASS INDEX: 32.64 KG/M2 | DIASTOLIC BLOOD PRESSURE: 89 MMHG | OXYGEN SATURATION: 94 %

## 2021-11-24 DIAGNOSIS — F17.200 CURRENT EVERY DAY SMOKER: Chronic | ICD-10-CM

## 2021-11-24 DIAGNOSIS — M54.50 ACUTE MIDLINE LOW BACK PAIN WITHOUT SCIATICA: ICD-10-CM

## 2021-11-24 DIAGNOSIS — M54.50 ACUTE MIDLINE LOW BACK PAIN WITHOUT SCIATICA: Primary | ICD-10-CM

## 2021-11-24 PROCEDURE — 99214 OFFICE O/P EST MOD 30 MIN: CPT | Performed by: NURSE PRACTITIONER

## 2021-11-24 PROCEDURE — 99407 BEHAV CHNG SMOKING > 10 MIN: CPT | Performed by: NURSE PRACTITIONER

## 2021-11-24 PROCEDURE — 72110 X-RAY EXAM L-2 SPINE 4/>VWS: CPT

## 2021-11-24 RX ORDER — LIDOCAINE 50 MG/G
1 PATCH TOPICAL EVERY 24 HOURS
Qty: 20 PATCH | Refills: 2 | Status: SHIPPED | OUTPATIENT
Start: 2021-11-24

## 2021-11-24 RX ORDER — VARENICLINE TARTRATE 1 MG/1
1 TABLET, FILM COATED ORAL 2 TIMES DAILY
Qty: 56 TABLET | Refills: 1 | Status: SHIPPED | OUTPATIENT
Start: 2021-12-22 | End: 2022-02-16

## 2021-11-24 RX ORDER — HYDROCODONE BITARTRATE AND ACETAMINOPHEN 5; 325 MG/1; MG/1
1 TABLET ORAL EVERY 6 HOURS PRN
Qty: 12 TABLET | Refills: 0 | Status: SHIPPED | OUTPATIENT
Start: 2021-11-24 | End: 2022-11-08

## 2022-01-18 ENCOUNTER — LAB (OUTPATIENT)
Dept: LAB | Facility: HOSPITAL | Age: 67
End: 2022-01-18

## 2022-01-18 DIAGNOSIS — I65.21 STENOSIS OF RIGHT CAROTID ARTERY: ICD-10-CM

## 2022-01-18 DIAGNOSIS — Z86.73 H/O: CVA (CEREBROVASCULAR ACCIDENT): ICD-10-CM

## 2022-01-18 DIAGNOSIS — E29.1 HYPOGONADISM IN MALE: ICD-10-CM

## 2022-01-18 DIAGNOSIS — I65.23 CAROTID STENOSIS, ASYMPTOMATIC, BILATERAL: ICD-10-CM

## 2022-01-18 DIAGNOSIS — E55.9 VITAMIN D DEFICIENCY: ICD-10-CM

## 2022-01-18 DIAGNOSIS — R73.01 IFG (IMPAIRED FASTING GLUCOSE): ICD-10-CM

## 2022-01-18 DIAGNOSIS — E78.2 MIXED HYPERLIPIDEMIA: ICD-10-CM

## 2022-01-18 DIAGNOSIS — I10 ESSENTIAL HYPERTENSION: ICD-10-CM

## 2022-01-18 LAB
25(OH)D3 SERPL-MCNC: 36.5 NG/ML (ref 30–100)
ALBUMIN SERPL-MCNC: 4.7 G/DL (ref 3.5–5.2)
ALBUMIN/GLOB SERPL: 1.6 G/DL
ALP SERPL-CCNC: 66 U/L (ref 39–117)
ALT SERPL W P-5'-P-CCNC: 42 U/L (ref 1–41)
ANION GAP SERPL CALCULATED.3IONS-SCNC: 12.8 MMOL/L (ref 5–15)
AST SERPL-CCNC: 30 U/L (ref 1–40)
BASOPHILS # BLD AUTO: 0.08 10*3/MM3 (ref 0–0.2)
BASOPHILS NFR BLD AUTO: 0.9 % (ref 0–1.5)
BILIRUB SERPL-MCNC: 0.5 MG/DL (ref 0–1.2)
BUN SERPL-MCNC: 21 MG/DL (ref 8–23)
BUN/CREAT SERPL: 18.1 (ref 7–25)
CALCIUM SPEC-SCNC: 9.5 MG/DL (ref 8.6–10.5)
CHLORIDE SERPL-SCNC: 103 MMOL/L (ref 98–107)
CHOLEST SERPL-MCNC: 139 MG/DL (ref 0–200)
CO2 SERPL-SCNC: 26.2 MMOL/L (ref 22–29)
CREAT SERPL-MCNC: 1.16 MG/DL (ref 0.76–1.27)
DEPRECATED RDW RBC AUTO: 48.3 FL (ref 37–54)
EOSINOPHIL # BLD AUTO: 0.38 10*3/MM3 (ref 0–0.4)
EOSINOPHIL NFR BLD AUTO: 4.1 % (ref 0.3–6.2)
ERYTHROCYTE [DISTWIDTH] IN BLOOD BY AUTOMATED COUNT: 12.8 % (ref 12.3–15.4)
GFR SERPL CREATININE-BSD FRML MDRD: 63 ML/MIN/1.73
GLOBULIN UR ELPH-MCNC: 3 GM/DL
GLUCOSE SERPL-MCNC: 96 MG/DL (ref 65–99)
HBA1C MFR BLD: 6.52 % (ref 4.8–5.6)
HCT VFR BLD AUTO: 52 % (ref 37.5–51)
HDLC SERPL-MCNC: 37 MG/DL (ref 40–60)
HGB BLD-MCNC: 17.6 G/DL (ref 13–17.7)
IMM GRANULOCYTES # BLD AUTO: 0.04 10*3/MM3 (ref 0–0.05)
IMM GRANULOCYTES NFR BLD AUTO: 0.4 % (ref 0–0.5)
LDLC SERPL CALC-MCNC: 79 MG/DL (ref 0–100)
LDLC/HDLC SERPL: 2.08 {RATIO}
LYMPHOCYTES # BLD AUTO: 2.18 10*3/MM3 (ref 0.7–3.1)
LYMPHOCYTES NFR BLD AUTO: 23.6 % (ref 19.6–45.3)
MCH RBC QN AUTO: 34.3 PG (ref 26.6–33)
MCHC RBC AUTO-ENTMCNC: 33.8 G/DL (ref 31.5–35.7)
MCV RBC AUTO: 101.4 FL (ref 79–97)
MONOCYTES # BLD AUTO: 0.58 10*3/MM3 (ref 0.1–0.9)
MONOCYTES NFR BLD AUTO: 6.3 % (ref 5–12)
NEUTROPHILS NFR BLD AUTO: 5.98 10*3/MM3 (ref 1.7–7)
NEUTROPHILS NFR BLD AUTO: 64.7 % (ref 42.7–76)
NRBC BLD AUTO-RTO: 0 /100 WBC (ref 0–0.2)
PLATELET # BLD AUTO: 258 10*3/MM3 (ref 140–450)
PMV BLD AUTO: 10.2 FL (ref 6–12)
POTASSIUM SERPL-SCNC: 4.6 MMOL/L (ref 3.5–5.2)
PROT SERPL-MCNC: 7.7 G/DL (ref 6–8.5)
RBC # BLD AUTO: 5.13 10*6/MM3 (ref 4.14–5.8)
SODIUM SERPL-SCNC: 142 MMOL/L (ref 136–145)
TRIGL SERPL-MCNC: 125 MG/DL (ref 0–150)
VLDLC SERPL-MCNC: 23 MG/DL (ref 5–40)
WBC NRBC COR # BLD: 9.24 10*3/MM3 (ref 3.4–10.8)

## 2022-01-18 PROCEDURE — 83036 HEMOGLOBIN GLYCOSYLATED A1C: CPT

## 2022-01-18 PROCEDURE — 80053 COMPREHEN METABOLIC PANEL: CPT

## 2022-01-18 PROCEDURE — 85025 COMPLETE CBC W/AUTO DIFF WBC: CPT

## 2022-01-18 PROCEDURE — 36415 COLL VENOUS BLD VENIPUNCTURE: CPT

## 2022-01-18 PROCEDURE — 80061 LIPID PANEL: CPT

## 2022-01-18 PROCEDURE — 82306 VITAMIN D 25 HYDROXY: CPT

## 2022-02-01 ENCOUNTER — OFFICE VISIT (OUTPATIENT)
Dept: INTERNAL MEDICINE | Facility: CLINIC | Age: 67
End: 2022-02-01

## 2022-02-01 VITALS
OXYGEN SATURATION: 94 % | WEIGHT: 226.4 LBS | BODY MASS INDEX: 32.41 KG/M2 | HEIGHT: 70 IN | TEMPERATURE: 97.9 F | SYSTOLIC BLOOD PRESSURE: 146 MMHG | HEART RATE: 78 BPM | DIASTOLIC BLOOD PRESSURE: 84 MMHG

## 2022-02-01 DIAGNOSIS — I10 ESSENTIAL HYPERTENSION: ICD-10-CM

## 2022-02-01 DIAGNOSIS — E55.9 VITAMIN D DEFICIENCY: ICD-10-CM

## 2022-02-01 DIAGNOSIS — I65.21 STENOSIS OF RIGHT CAROTID ARTERY: ICD-10-CM

## 2022-02-01 DIAGNOSIS — E78.2 MIXED HYPERLIPIDEMIA: ICD-10-CM

## 2022-02-01 DIAGNOSIS — Z12.5 SCREENING PSA (PROSTATE SPECIFIC ANTIGEN): ICD-10-CM

## 2022-02-01 DIAGNOSIS — E13.9 DIABETES 1.5, MANAGED AS TYPE 2: Primary | ICD-10-CM

## 2022-02-01 DIAGNOSIS — E29.1 HYPOGONADISM IN MALE: ICD-10-CM

## 2022-02-01 DIAGNOSIS — Z86.73 H/O: CVA (CEREBROVASCULAR ACCIDENT): ICD-10-CM

## 2022-02-01 PROCEDURE — 99214 OFFICE O/P EST MOD 30 MIN: CPT | Performed by: INTERNAL MEDICINE

## 2022-02-01 NOTE — PROGRESS NOTES
"Chief Complaint/ HPI: f/u with htn and labs  Doing well  No soa  No cp           Objective   Vital Signs  Vitals:    02/01/22 1317   BP: 146/84   Pulse: 78   Temp: 97.9 °F (36.6 °C)   SpO2: 94%   Weight: 103 kg (226 lb 6.4 oz)   Height: 177.8 cm (70\")      Body mass index is 32.49 kg/m².  Review of Systems   Constitutional: Negative.    HENT: Negative.    Eyes: Negative.    Respiratory: Negative.    Cardiovascular: Negative.    Gastrointestinal: Negative.    Endocrine: Negative.    Genitourinary: Negative.    Musculoskeletal: Negative.    Allergic/Immunologic: Negative.    Neurological: Negative.    Hematological: Negative.    Psychiatric/Behavioral: Negative.       Physical Exam  Constitutional:       General: He is not in acute distress.     Appearance: Normal appearance.   HENT:      Head: Normocephalic.      Mouth/Throat:      Mouth: Mucous membranes are moist.   Eyes:      Conjunctiva/sclera: Conjunctivae normal.      Pupils: Pupils are equal, round, and reactive to light.   Cardiovascular:      Rate and Rhythm: Normal rate and regular rhythm.      Pulses: Normal pulses.      Heart sounds: Normal heart sounds.   Pulmonary:      Effort: Pulmonary effort is normal.      Breath sounds: Normal breath sounds.   Abdominal:      General: Abdomen is flat. Bowel sounds are normal.      Palpations: Abdomen is soft.   Musculoskeletal:         General: No swelling. Normal range of motion.      Cervical back: Neck supple.   Skin:     General: Skin is warm and dry.      Coloration: Skin is not jaundiced.   Neurological:      General: No focal deficit present.      Mental Status: He is alert and oriented to person, place, and time. Mental status is at baseline.   Psychiatric:         Mood and Affect: Mood normal.         Behavior: Behavior normal.         Thought Content: Thought content normal.         Judgment: Judgment normal.        Result Review :   No results found for: PROBNP, BNP  CMP    CMP 7/22/21 1/18/22   Glucose " 106 (A) 96   BUN 17 21   Creatinine 1.08 1.16   eGFR Non African Am 68 63   Sodium 140 142   Potassium 4.9 4.6   Chloride 105 103   Calcium 9.1 9.5   Albumin 4.10 4.70   Total Bilirubin 0.2 0.5   Alkaline Phosphatase 70 66   AST (SGOT) 30 30   ALT (SGPT) 42 (A) 42 (A)   (A) Abnormal value            CBC w/diff    CBC w/Diff 7/22/21 1/18/22   WBC 6.07 9.24   RBC 4.77 5.13   Hemoglobin 16.2 17.6   Hematocrit 48.8 52.0 (A)   .3 (A) 101.4 (A)   MCH 34.0 (A) 34.3 (A)   MCHC 33.2 33.8   RDW 13.0 12.8   Platelets 227 258   Neutrophil Rel % 54.1 64.7   Immature Granulocyte Rel % 0.3 0.4   Lymphocyte Rel % 30.6 23.6   Monocyte Rel % 8.4 6.3   Eosinophil Rel % 5.9 4.1   Basophil Rel % 0.7 0.9   (A) Abnormal value             Lipid Panel    Lipid Panel 7/22/21 1/18/22   Total Cholesterol 147 139   Triglycerides 183 (A) 125   HDL Cholesterol 30 (A) 37 (A)   VLDL Cholesterol 32 23   LDL Cholesterol  85 79   LDL/HDL Ratio 2.68 2.08   (A) Abnormal value             Lab Results   Component Value Date    TSH 2.410 07/22/2021    TSH 1.810 07/14/2020      No results found for: FREET4   A1C Last 3 Results    HGBA1C Last 3 Results 7/22/21 1/18/22   Hemoglobin A1C 5.80 (A) 6.52 (A)   (A) Abnormal value             PSA    PSA 7/22/21   PSA 1.670                          Visit Diagnoses:    ICD-10-CM ICD-9-CM   1. Diabetes 1.5, managed as type 2 (HCC)  E13.9 250.00   2. Hypogonadism in male  E29.1 257.2   3. Essential hypertension  I10 401.9   4. Stenosis of right carotid artery  I65.21 433.10   5. Vitamin D deficiency  E55.9 268.9   6. Mixed hyperlipidemia  E78.2 272.2   7. H/O: CVA (cerebrovascular accident)  Z86.73 V12.54   8. Screening PSA (prostate specific antigen)  Z12.5 V76.44       Assessment and Plan   Diagnoses and all orders for this visit:    1. Diabetes 1.5, managed as type 2 (HCC) (Primary)  -     Hemoglobin A1c; Future  -     Comprehensive Metabolic Panel; Future  -     CBC & Differential; Future  -     Lipid Panel;  Future  -     PSA Screen; Future    2. Hypogonadism in male  -     Hemoglobin A1c; Future  -     Comprehensive Metabolic Panel; Future  -     CBC & Differential; Future  -     Lipid Panel; Future  -     PSA Screen; Future    3. Essential hypertension  -     Hemoglobin A1c; Future  -     Comprehensive Metabolic Panel; Future  -     CBC & Differential; Future  -     Lipid Panel; Future  -     PSA Screen; Future    4. Stenosis of right carotid artery  -     Hemoglobin A1c; Future  -     Comprehensive Metabolic Panel; Future  -     CBC & Differential; Future  -     Lipid Panel; Future  -     PSA Screen; Future    5. Vitamin D deficiency  -     Hemoglobin A1c; Future  -     Comprehensive Metabolic Panel; Future  -     CBC & Differential; Future  -     Lipid Panel; Future  -     PSA Screen; Future    6. Mixed hyperlipidemia  -     Hemoglobin A1c; Future  -     Comprehensive Metabolic Panel; Future  -     CBC & Differential; Future  -     Lipid Panel; Future  -     PSA Screen; Future    7. H/O: CVA (cerebrovascular accident)  -     Hemoglobin A1c; Future  -     Comprehensive Metabolic Panel; Future  -     CBC & Differential; Future  -     Lipid Panel; Future  -     PSA Screen; Future    8. Screening PSA (prostate specific antigen)  -     Hemoglobin A1c; Future  -     Comprehensive Metabolic Panel; Future  -     CBC & Differential; Future  -     Lipid Panel; Future  -     PSA Screen; Future        Mild vitamin D, B12, folate deficiencies July 20, 2021 patient will start over-the-counter supplements    Polycythemia--possibly secondary, discussed January 2022---, will monitor closely consider donating blood,    Impaired fasting glucose,/ dm 2 --- hemoglobin A1c down slightly to 5.8 July 2021, up to 6.5 January 2022 discussed weight loss efforts versus medications, continue exercise monitor diet,    Carotid stenosis right artery--80-99, -% feb 2018, - S/P R CEA surgery August 29, 2019 in [place] ----cardiac stress testing  August 21 in [place] and echocardiogram------dr loren lowery at [facility]--   Cont plavix ---, Follow-up CAROTIDS jUNE 2020 WITH 50% STENOSIS, right-sided stable July 2020, Patient continues to follow-up with vascular surgery, stable recent carotid ultrasound January 2021    Low testosterone  Low again 143, July 2021, ----- options discussed, will wait on treatment for now.  PSA levels dropped to 1.6 July 28, 2021,     CAD, REMOTE MI--CHOL GOOD, -continue-lipitor 20 mg qd , Plavix continues    OLD STROKEs- 2004-MILD COG IMPAIRMENT, L UE WEAKNESS,? VERY MILD INTERMITTENT DYSPHAGIA    LUMBAR- CERVICAL DJD-- PRIOR LUMBAR DISC SURGERY    C SCOPE 12/15, DUSTY, 2 X POLYPS    Ultrasound aorta -- vascular surgery clinic in [place]----- NO significant aneurysm August 2019,    DISCUSSED LOW DOSE CT SCREENING FOR LUNG CA SCREENING feb 2022              Follow Up   Return in about 6 months (around 8/1/2022).  Patient was given instructions and counseling regarding his condition or for health maintenance advice. Please see specific information pulled into the AVS if appropriate.

## 2022-05-24 RX ORDER — CLOPIDOGREL BISULFATE 75 MG/1
TABLET ORAL
Qty: 90 TABLET | Refills: 1 | Status: SHIPPED | OUTPATIENT
Start: 2022-05-24 | End: 2022-11-09 | Stop reason: HOSPADM

## 2022-06-15 DIAGNOSIS — E78.2 MIXED HYPERLIPIDEMIA: ICD-10-CM

## 2022-06-15 RX ORDER — ATORVASTATIN CALCIUM 20 MG/1
20 TABLET, FILM COATED ORAL NIGHTLY
Qty: 90 TABLET | Refills: 1 | Status: SHIPPED | OUTPATIENT
Start: 2022-06-15 | End: 2023-01-04

## 2022-06-15 RX ORDER — AMLODIPINE BESYLATE 2.5 MG/1
TABLET ORAL
Qty: 90 TABLET | Refills: 3 | Status: SHIPPED | OUTPATIENT
Start: 2022-06-15 | End: 2022-11-15

## 2022-07-29 ENCOUNTER — LAB (OUTPATIENT)
Dept: LAB | Facility: HOSPITAL | Age: 67
End: 2022-07-29

## 2022-07-29 DIAGNOSIS — Z12.5 SCREENING PSA (PROSTATE SPECIFIC ANTIGEN): ICD-10-CM

## 2022-07-29 DIAGNOSIS — Z86.73 H/O: CVA (CEREBROVASCULAR ACCIDENT): ICD-10-CM

## 2022-07-29 DIAGNOSIS — I65.21 STENOSIS OF RIGHT CAROTID ARTERY: ICD-10-CM

## 2022-07-29 DIAGNOSIS — E78.2 MIXED HYPERLIPIDEMIA: ICD-10-CM

## 2022-07-29 DIAGNOSIS — I10 ESSENTIAL HYPERTENSION: ICD-10-CM

## 2022-07-29 DIAGNOSIS — E29.1 HYPOGONADISM IN MALE: ICD-10-CM

## 2022-07-29 DIAGNOSIS — E13.9 DIABETES 1.5, MANAGED AS TYPE 2: ICD-10-CM

## 2022-07-29 DIAGNOSIS — E55.9 VITAMIN D DEFICIENCY: ICD-10-CM

## 2022-07-29 LAB
ALBUMIN SERPL-MCNC: 4.2 G/DL (ref 3.5–5.2)
ALBUMIN/GLOB SERPL: 1.8 G/DL
ALP SERPL-CCNC: 60 U/L (ref 39–117)
ALT SERPL W P-5'-P-CCNC: 36 U/L (ref 1–41)
ANION GAP SERPL CALCULATED.3IONS-SCNC: 12.8 MMOL/L (ref 5–15)
AST SERPL-CCNC: 29 U/L (ref 1–40)
BASOPHILS # BLD AUTO: 0.05 10*3/MM3 (ref 0–0.2)
BASOPHILS NFR BLD AUTO: 0.7 % (ref 0–1.5)
BILIRUB SERPL-MCNC: 0.5 MG/DL (ref 0–1.2)
BUN SERPL-MCNC: 15 MG/DL (ref 8–23)
BUN/CREAT SERPL: 16.1 (ref 7–25)
CALCIUM SPEC-SCNC: 8.7 MG/DL (ref 8.6–10.5)
CHLORIDE SERPL-SCNC: 106 MMOL/L (ref 98–107)
CHOLEST SERPL-MCNC: 124 MG/DL (ref 0–200)
CO2 SERPL-SCNC: 22.2 MMOL/L (ref 22–29)
CREAT SERPL-MCNC: 0.93 MG/DL (ref 0.76–1.27)
DEPRECATED RDW RBC AUTO: 48.5 FL (ref 37–54)
EGFRCR SERPLBLD CKD-EPI 2021: 90 ML/MIN/1.73
EOSINOPHIL # BLD AUTO: 0.38 10*3/MM3 (ref 0–0.4)
EOSINOPHIL NFR BLD AUTO: 5.6 % (ref 0.3–6.2)
ERYTHROCYTE [DISTWIDTH] IN BLOOD BY AUTOMATED COUNT: 13 % (ref 12.3–15.4)
GLOBULIN UR ELPH-MCNC: 2.3 GM/DL
GLUCOSE SERPL-MCNC: 99 MG/DL (ref 65–99)
HBA1C MFR BLD: 5.8 % (ref 4.8–5.6)
HCT VFR BLD AUTO: 44.6 % (ref 37.5–51)
HDLC SERPL-MCNC: 37 MG/DL (ref 40–60)
HGB BLD-MCNC: 15.5 G/DL (ref 13–17.7)
IMM GRANULOCYTES # BLD AUTO: 0.02 10*3/MM3 (ref 0–0.05)
IMM GRANULOCYTES NFR BLD AUTO: 0.3 % (ref 0–0.5)
LDLC SERPL CALC-MCNC: 62 MG/DL (ref 0–100)
LDLC/HDLC SERPL: 1.57 {RATIO}
LYMPHOCYTES # BLD AUTO: 1.63 10*3/MM3 (ref 0.7–3.1)
LYMPHOCYTES NFR BLD AUTO: 23.9 % (ref 19.6–45.3)
MCH RBC QN AUTO: 35.1 PG (ref 26.6–33)
MCHC RBC AUTO-ENTMCNC: 34.8 G/DL (ref 31.5–35.7)
MCV RBC AUTO: 101.1 FL (ref 79–97)
MONOCYTES # BLD AUTO: 0.51 10*3/MM3 (ref 0.1–0.9)
MONOCYTES NFR BLD AUTO: 7.5 % (ref 5–12)
NEUTROPHILS NFR BLD AUTO: 4.24 10*3/MM3 (ref 1.7–7)
NEUTROPHILS NFR BLD AUTO: 62 % (ref 42.7–76)
NRBC BLD AUTO-RTO: 0 /100 WBC (ref 0–0.2)
PLATELET # BLD AUTO: 217 10*3/MM3 (ref 140–450)
PMV BLD AUTO: 10.4 FL (ref 6–12)
POTASSIUM SERPL-SCNC: 4.2 MMOL/L (ref 3.5–5.2)
PROT SERPL-MCNC: 6.5 G/DL (ref 6–8.5)
PSA SERPL-MCNC: 2.19 NG/ML (ref 0–4)
RBC # BLD AUTO: 4.41 10*6/MM3 (ref 4.14–5.8)
SODIUM SERPL-SCNC: 141 MMOL/L (ref 136–145)
TRIGL SERPL-MCNC: 145 MG/DL (ref 0–150)
VLDLC SERPL-MCNC: 25 MG/DL (ref 5–40)
WBC NRBC COR # BLD: 6.83 10*3/MM3 (ref 3.4–10.8)

## 2022-07-29 PROCEDURE — 80053 COMPREHEN METABOLIC PANEL: CPT

## 2022-07-29 PROCEDURE — G0103 PSA SCREENING: HCPCS

## 2022-07-29 PROCEDURE — 36415 COLL VENOUS BLD VENIPUNCTURE: CPT

## 2022-07-29 PROCEDURE — 85025 COMPLETE CBC W/AUTO DIFF WBC: CPT

## 2022-07-29 PROCEDURE — 83036 HEMOGLOBIN GLYCOSYLATED A1C: CPT

## 2022-07-29 PROCEDURE — 80061 LIPID PANEL: CPT

## 2022-08-03 ENCOUNTER — OFFICE VISIT (OUTPATIENT)
Dept: INTERNAL MEDICINE | Facility: CLINIC | Age: 67
End: 2022-08-03

## 2022-08-03 VITALS
HEIGHT: 70 IN | SYSTOLIC BLOOD PRESSURE: 137 MMHG | HEART RATE: 63 BPM | OXYGEN SATURATION: 96 % | WEIGHT: 211.6 LBS | TEMPERATURE: 97.5 F | BODY MASS INDEX: 30.29 KG/M2 | DIASTOLIC BLOOD PRESSURE: 82 MMHG

## 2022-08-03 DIAGNOSIS — Z12.5 SCREENING PSA (PROSTATE SPECIFIC ANTIGEN): ICD-10-CM

## 2022-08-03 DIAGNOSIS — E78.2 MIXED HYPERLIPIDEMIA: ICD-10-CM

## 2022-08-03 DIAGNOSIS — I65.21 STENOSIS OF RIGHT CAROTID ARTERY: ICD-10-CM

## 2022-08-03 DIAGNOSIS — E29.1 HYPOGONADISM IN MALE: Primary | ICD-10-CM

## 2022-08-03 DIAGNOSIS — I65.23 CAROTID STENOSIS, ASYMPTOMATIC, BILATERAL: ICD-10-CM

## 2022-08-03 DIAGNOSIS — E55.9 VITAMIN D DEFICIENCY: ICD-10-CM

## 2022-08-03 DIAGNOSIS — Z86.73 H/O: CVA (CEREBROVASCULAR ACCIDENT): ICD-10-CM

## 2022-08-03 DIAGNOSIS — E11.9 TYPE 2 DIABETES MELLITUS WITHOUT COMPLICATION, WITHOUT LONG-TERM CURRENT USE OF INSULIN: ICD-10-CM

## 2022-08-03 DIAGNOSIS — K64.0 GRADE I HEMORRHOIDS: ICD-10-CM

## 2022-08-03 DIAGNOSIS — I10 ESSENTIAL HYPERTENSION: ICD-10-CM

## 2022-08-03 PROCEDURE — 99214 OFFICE O/P EST MOD 30 MIN: CPT | Performed by: INTERNAL MEDICINE

## 2022-08-03 NOTE — PROGRESS NOTES
"Chief Complaint/ HPI: --Hemorrhoidal issues, here to follow-up with his regular checkup, has had some pain bleeding and itching, wants referral for general surgery,          Objective   Vital Signs  Vitals:    08/03/22 1348   BP: 137/82   Pulse: 63   Temp: 97.5 °F (36.4 °C)   SpO2: 96%   Weight: 96 kg (211 lb 9.6 oz)   Height: 177.8 cm (70\")      Body mass index is 30.36 kg/m².  Review of Systems   Gastrointestinal: Positive for anal bleeding.      Physical Exam  Constitutional:       General: He is not in acute distress.     Appearance: Normal appearance.   HENT:      Head: Normocephalic.      Mouth/Throat:      Mouth: Mucous membranes are moist.   Eyes:      Conjunctiva/sclera: Conjunctivae normal.      Pupils: Pupils are equal, round, and reactive to light.   Cardiovascular:      Rate and Rhythm: Normal rate and regular rhythm.      Pulses: Normal pulses.      Heart sounds: Murmur heard.   Pulmonary:      Effort: Pulmonary effort is normal.      Breath sounds: Normal breath sounds.   Abdominal:      General: Abdomen is flat. Bowel sounds are normal.      Palpations: Abdomen is soft.   Musculoskeletal:         General: No swelling. Normal range of motion.      Cervical back: Neck supple.   Skin:     General: Skin is warm and dry.      Coloration: Skin is not jaundiced.   Neurological:      General: No focal deficit present.      Mental Status: He is alert and oriented to person, place, and time. Mental status is at baseline.   Psychiatric:         Mood and Affect: Mood normal.         Behavior: Behavior normal.         Thought Content: Thought content normal.         Judgment: Judgment normal.        Result Review :   No results found for: PROBNP, BNP  CMP    CMP 1/18/22 7/29/22   Glucose 96 99   BUN 21 15   Creatinine 1.16 0.93   eGFR Non African Am 63    Sodium 142 141   Potassium 4.6 4.2   Chloride 103 106   Calcium 9.5 8.7   Albumin 4.70 4.20   Total Bilirubin 0.5 0.5   Alkaline Phosphatase 66 60   AST (SGOT) " 30 29   ALT (SGPT) 42 (A) 36   (A) Abnormal value            CBC w/diff    CBC w/Diff 1/18/22 7/29/22   WBC 9.24 6.83   RBC 5.13 4.41   Hemoglobin 17.6 15.5   Hematocrit 52.0 (A) 44.6   .4 (A) 101.1 (A)   MCH 34.3 (A) 35.1 (A)   MCHC 33.8 34.8   RDW 12.8 13.0   Platelets 258 217   Neutrophil Rel % 64.7 62.0   Immature Granulocyte Rel % 0.4 0.3   Lymphocyte Rel % 23.6 23.9   Monocyte Rel % 6.3 7.5   Eosinophil Rel % 4.1 5.6   Basophil Rel % 0.9 0.7   (A) Abnormal value             Lipid Panel    Lipid Panel 1/18/22 7/29/22   Total Cholesterol 139 124   Triglycerides 125 145   HDL Cholesterol 37 (A) 37 (A)   VLDL Cholesterol 23 25   LDL Cholesterol  79 62   LDL/HDL Ratio 2.08 1.57   (A) Abnormal value             Lab Results   Component Value Date    TSH 2.410 07/22/2021    TSH 1.810 07/14/2020      No results found for: FREET4   A1C Last 3 Results    HGBA1C Last 3 Results 1/18/22 7/29/22   Hemoglobin A1C 6.52 (A) 5.80 (A)   (A) Abnormal value             PSA    PSA 7/29/22   PSA 2.190                          Visit Diagnoses:    ICD-10-CM ICD-9-CM   1. Hypogonadism in male  E29.1 257.2   2. H/O: CVA (cerebrovascular accident)  Z86.73 V12.54   3. Screening PSA (prostate specific antigen)  Z12.5 V76.44   4. Essential hypertension  I10 401.9   5. Mixed hyperlipidemia  E78.2 272.2   6. Type 2 diabetes mellitus without complication, without long-term current use of insulin (HCC)  E11.9 250.00   7. Vitamin D deficiency  E55.9 268.9   8. Carotid stenosis, asymptomatic, bilateral  I65.23 433.10     433.30   9. Stenosis of right carotid artery  I65.21 433.10   10. Grade I hemorrhoids  K64.0 455.0       Assessment and Plan   Diagnoses and all orders for this visit:    1. Hypogonadism in male (Primary)  -     Lipid Panel; Future  -     Hemoglobin A1c; Future  -     Comprehensive Metabolic Panel; Future  -     CBC & Differential; Future  -     Ambulatory Referral to General Surgery    2. H/O: CVA (cerebrovascular  accident)  -     Lipid Panel; Future  -     Hemoglobin A1c; Future  -     Comprehensive Metabolic Panel; Future  -     CBC & Differential; Future  -     Ambulatory Referral to General Surgery    3. Screening PSA (prostate specific antigen)  -     Lipid Panel; Future  -     Hemoglobin A1c; Future  -     Comprehensive Metabolic Panel; Future  -     CBC & Differential; Future  -     Ambulatory Referral to General Surgery    4. Essential hypertension  -     Lipid Panel; Future  -     Hemoglobin A1c; Future  -     Comprehensive Metabolic Panel; Future  -     CBC & Differential; Future  -     Ambulatory Referral to General Surgery    5. Mixed hyperlipidemia  -     Lipid Panel; Future  -     Hemoglobin A1c; Future  -     Comprehensive Metabolic Panel; Future  -     CBC & Differential; Future  -     Ambulatory Referral to General Surgery    6. Type 2 diabetes mellitus without complication, without long-term current use of insulin (HCC)  -     Lipid Panel; Future  -     Hemoglobin A1c; Future  -     Comprehensive Metabolic Panel; Future  -     CBC & Differential; Future  -     Ambulatory Referral to General Surgery    7. Vitamin D deficiency  -     Lipid Panel; Future  -     Hemoglobin A1c; Future  -     Comprehensive Metabolic Panel; Future  -     CBC & Differential; Future  -     Ambulatory Referral to General Surgery    8. Carotid stenosis, asymptomatic, bilateral  -     Lipid Panel; Future  -     Hemoglobin A1c; Future  -     Comprehensive Metabolic Panel; Future  -     CBC & Differential; Future  -     Ambulatory Referral to General Surgery    9. Stenosis of right carotid artery  -     Lipid Panel; Future  -     Hemoglobin A1c; Future  -     Comprehensive Metabolic Panel; Future  -     CBC & Differential; Future  -     Ambulatory Referral to General Surgery    10. Grade I hemorrhoids  -     Ambulatory Referral to General Surgery    Hemorrhoids, will refer to Dr. Randhawa general surgery, August 3, 2022    Mild vitamin D3,  B12, folate deficiencies continues ot counter supplements    Polycythemia--possibly secondary,- consider donating blood,    Impaired fasting glucose,/ dm 2 --- hemoglobin A1c down slightly to 5.8 July 2022, up to 6.5 January 2022 discussed weight loss efforts versus medications, continue exercise monitor diet,    Carotid stenosis right artery--80-99, -% feb 2018, - S/P R CEA surgery August 29, 2019 in [place] ----cardiac stress testing August 21 in [place] and echocardiogram------[facility] loren [facility]y at [facility]--   Cont plavix ---, Follow-up CAROTIDS jUNE 2020 WITH 50% STENOSIS, right-sided stable July 2020, Patient continues to follow-up with vascular surgery, --continues yearly follow-up now as of July 2022    Heart murmur, questionable previous echo August 2019 showed normal ejection fraction, no significant valve abnormalities reported, consider repeating test at some point    Low testosterone Low again 143, July 2021, -- PSA at 2.1 July 2022     CAD, REMOTE MI--    Mixed hyperlipidemia, --CHOL GOOD, -continue-lipitor 20 mg qd , Plavix continues    OLD STROKEs- 2004-MILD COG IMPAIRMENT, L UE WEAKNESS,? VERY MILD INTERMITTENT DYSPHAGIA    LUMBAR- CERVICAL DJD-- PRIOR LUMBAR DISC SURGERY    C SCOPE 12/15, DUSTY, 2 X POLYPS    Ultrasound aorta -- vascular surgery clinic in [place]----- NO significant aneurysm August 2019,    DISCUSSED LOW DOSE CT SCREENING FOR LUNG CA SCREENING feb 2022                Follow Up   Return in about 6 months (around 2/3/2023).  Patient was given instructions and counseling regarding his condition or for health maintenance advice. Please see specific information pulled into the AVS if appropriate.

## 2022-08-18 ENCOUNTER — OFFICE VISIT (OUTPATIENT)
Dept: SURGERY | Facility: CLINIC | Age: 67
End: 2022-08-18

## 2022-08-18 VITALS — HEIGHT: 69 IN | RESPIRATION RATE: 16 BRPM | WEIGHT: 221 LBS | BODY MASS INDEX: 32.73 KG/M2

## 2022-08-18 DIAGNOSIS — L29.0 ANAL PRURITUS: Primary | ICD-10-CM

## 2022-08-18 PROCEDURE — 99203 OFFICE O/P NEW LOW 30 MIN: CPT | Performed by: SURGERY

## 2022-08-19 NOTE — PROGRESS NOTES
General Surgery/Colorectal Surgery Note    Patient Name:  Aram Sarmiento  YOB: 1955  [id number]    Referring Provider: Rolando Darling,*      Patient Care Team:  Rolando Darling MD as PCP - General (Internal Medicine)  Flavio Randhawa MD as Consulting Physician (General Surgery)    Chief complaint anal itching    Subjective .     History of present illness:    He is a 30-year history of hemorrhoids with occasional hematochezia.  No pain with bowel movements.  No prolapse.  2 years ago colonoscopy with polyps removed.  He is on and off the toilet quickly.  No fiber use.  Lately he has had increasing irritation around the anus with itching being his most prominent symptom.  He is try Preparation H and other over-the-counter products with no improvement.  He admits to trying to clean very well.      History:  Past Medical History:   Diagnosis Date   • Carotid artery stenosis     Bilateral   • Fibromyalgia    • Heart murmur    • Hyperlipidemia    • Hypertension    • Myocardial infarction (HCC)    • Personal history of transient ischemic attack (TIA), and cerebral infarction without residual deficits    • Postinfarction angina (HCC)    • Seasonal allergies    • Stroke (HCC)     2004 and 2006   • Weakness     LEFT SIDE FROM CVA       Past Surgical History:   Procedure Laterality Date   • CARDIAC CATHETERIZATION      x2   • CAROTID ENDARTERECTOMY Right 8/29/2019    Procedure: RIGHT CAROTID ENDARTERECTOMY;  Surgeon: Ed Vega MD;  Location: [facility];  Service: Vascular   • CLAVICLE SURGERY Right 2013   • COLONOSCOPY  2016   • LUMBAR FUSION     • SEPTOPLASTY         Family History   Problem Relation Age of Onset   • Lung cancer Mother 80   • COPD Mother    • Heart disease Father    • Heart disease Maternal Grandmother    • Stroke Maternal Grandmother    • Diabetes Maternal Grandmother    • Lung cancer Maternal Grandmother    • Malig Hyperthermia Neg Hx        Social History      Tobacco Use   • Smoking status: Current Every Day Smoker     Packs/day: 1.00     Types: Cigarettes   • Smokeless tobacco: Never Used   Vaping Use   • Vaping Use: Never used   Substance Use Topics   • Alcohol use: Yes     Alcohol/week: 3.0 standard drinks     Types: 3 Standard drinks or equivalent per week     Comment: q 2 weekly    • Drug use: Never       Review of Systems  All systems were reviewed and negative except for:   Review of Systems   Constitutional: Negative for chills, fever and unexpected weight loss.   HENT: Negative for congestion, nosebleeds and voice change.    Eyes: Negative for blurred vision, double vision and discharge.   Respiratory: Negative for apnea, chest tightness and shortness of breath.    Cardiovascular: Negative for chest pain and leg swelling.   Gastrointestinal:        See HPI   Endocrine: Negative for cold intolerance and heat intolerance.   Genitourinary: Negative for dysuria, hematuria and urgency.   Musculoskeletal: Negative for back pain, joint swelling and neck pain.   Skin: Negative for color change and dry skin.   Neurological: Negative for dizziness and confusion.   Hematological: Negative for adenopathy.   Psychiatric/Behavioral: Negative for agitation and behavioral problems.     MEDS:  Prior to Admission medications    Medication Sig Start Date End Date Taking? Authorizing Provider   amLODIPine (NORVASC) 2.5 MG tablet TAKE 1 TABLET BY MOUTH EVERY DAY 6/15/22  Yes Rolando Darling MD   atorvastatin (LIPITOR) 20 MG tablet TAKE 1 TABLET BY MOUTH EVERY NIGHT 6/15/22  Yes Rolando Darling MD   cetirizine (zyrTEC) 10 MG tablet Take 10 mg by mouth Daily.   Yes Provider, MD Zaid   clopidogrel (PLAVIX) 75 MG tablet TAKE 1 TABLET BY MOUTH EVERY DAY 5/24/22  Yes Rolando Darling MD   DULoxetine (CYMBALTA) 60 MG capsule TAKE ONE CAPSULE BY MOUTH TWICE DAILY 11/6/21  Yes Rolando Darling MD   HYDROcodone-acetaminophen (Norco) 5-325 MG per tablet  Take 1 tablet by mouth Every 6 (Six) Hours As Needed for Severe Pain . 11/24/21  Yes Lata Valenzuela APRN   lidocaine (LIDODERM) 5 % Place 1 patch on the skin as directed by provider Daily. Remove & Discard patch within 12 hours or as directed by MD 11/24/21  Yes Lata Valenzuela APRN   quinapril (ACCUPRIL) 40 MG tablet TAKE 1 TABLET BY MOUTH EVERY DAY 9/17/21  Yes Rolando Darling MD   vitamin B-12 (CYANOCOBALAMIN) 1000 MCG tablet Take 1,000 mcg by mouth Every Night.   Yes ProviderZaid MD   vitamin D3 125 MCG (5000 UT) capsule capsule Take 5,000 Units by mouth Daily.   Yes Provider, MD Zaid        Allergies:  Penicillins, Diclofenac-misoprostol, and Naproxen    Objective     Vital Signs   Resp:  [16] 16    Physical Exam:     General Appearance:    Alert, cooperative, in no acute distress   Head:    Normocephalic, without obvious abnormality, atraumatic   Eyes:          Conjunctivae and sclerae normal, no icterus,     Ears:    Ears appear intact with no abnormalities noted   Throat:   No oral lesions, no thrush, oral mucosa moist   Neck:   No adenopathy, supple, trachea midline, no thyromegaly   Back:     No kyphosis present, no scoliosis present, no skin lesions,      erythema or scars, no tenderness to percussion or                   palpation,   range of motion normal   Lungs:     Clear to auscultation,respirations regular, even and                  unlabored    Heart:    Regular rhythm and normal rate, normal S1 and S2, no            murmur, no gallop, no rub, no click   Chest Wall:    No abnormalities observed   Abdomen:     Normal bowel sounds, no masses, no organomegaly, soft        non-tender, non-distended, no guarding, no rebound                tenderness   Rectal:    No significant excoriation to the skin around the anus.  No fissure.  No fistula.  No thrombosed hemorrhoid.   Extremities:   Moves all extremities well, no edema, no cyanosis, no             redness   Pulses:    "Pulses palpable and equal bilaterally   Skin:   No bleeding, bruising or rash   Lymph nodes:   No palpable adenopathy   Neurologic:   A/o x 4 with no deficits       Results Review:   {Results Review:82511::\"I reviewed the patient's new clinical results.\"    LABS/IMAGING:  Results for orders placed or performed in visit on 07/29/22   Hemoglobin A1c    Specimen: Blood   Result Value Ref Range    Hemoglobin A1C 5.80 (H) 4.80 - 5.60 %   Comprehensive Metabolic Panel    Specimen: Blood   Result Value Ref Range    Glucose 99 65 - 99 mg/dL    BUN 15 8 - 23 mg/dL    Creatinine 0.93 0.76 - 1.27 mg/dL    Sodium 141 136 - 145 mmol/L    Potassium 4.2 3.5 - 5.2 mmol/L    Chloride 106 98 - 107 mmol/L    CO2 22.2 22.0 - 29.0 mmol/L    Calcium 8.7 8.6 - 10.5 mg/dL    Total Protein 6.5 6.0 - 8.5 g/dL    Albumin 4.20 3.50 - 5.20 g/dL    ALT (SGPT) 36 1 - 41 U/L    AST (SGOT) 29 1 - 40 U/L    Alkaline Phosphatase 60 39 - 117 U/L    Total Bilirubin 0.5 0.0 - 1.2 mg/dL    Globulin 2.3 gm/dL    A/G Ratio 1.8 g/dL    BUN/Creatinine Ratio 16.1 7.0 - 25.0    Anion Gap 12.8 5.0 - 15.0 mmol/L    eGFR 90.0 >60.0 mL/min/1.73   Lipid Panel    Specimen: Blood   Result Value Ref Range    Total Cholesterol 124 0 - 200 mg/dL    Triglycerides 145 0 - 150 mg/dL    HDL Cholesterol 37 (L) 40 - 60 mg/dL    LDL Cholesterol  62 0 - 100 mg/dL    VLDL Cholesterol 25 5 - 40 mg/dL    LDL/HDL Ratio 1.57    PSA Screen    Specimen: Blood   Result Value Ref Range    PSA 2.190 0.000 - 4.000 ng/mL   CBC Auto Differential    Specimen: Blood   Result Value Ref Range    WBC 6.83 3.40 - 10.80 10*3/mm3    RBC 4.41 4.14 - 5.80 10*6/mm3    Hemoglobin 15.5 13.0 - 17.7 g/dL    Hematocrit 44.6 37.5 - 51.0 %    .1 (H) 79.0 - 97.0 fL    MCH 35.1 (H) 26.6 - 33.0 pg    MCHC 34.8 31.5 - 35.7 g/dL    RDW 13.0 12.3 - 15.4 %    RDW-SD 48.5 37.0 - 54.0 fl    MPV 10.4 6.0 - 12.0 fL    Platelets 217 140 - 450 10*3/mm3    Neutrophil % 62.0 42.7 - 76.0 %    Lymphocyte % 23.9 19.6 - " 45.3 %    Monocyte % 7.5 5.0 - 12.0 %    Eosinophil % 5.6 0.3 - 6.2 %    Basophil % 0.7 0.0 - 1.5 %    Immature Grans % 0.3 0.0 - 0.5 %    Neutrophils, Absolute 4.24 1.70 - 7.00 10*3/mm3    Lymphocytes, Absolute 1.63 0.70 - 3.10 10*3/mm3    Monocytes, Absolute 0.51 0.10 - 0.90 10*3/mm3    Eosinophils, Absolute 0.38 0.00 - 0.40 10*3/mm3    Basophils, Absolute 0.05 0.00 - 0.20 10*3/mm3    Immature Grans, Absolute 0.02 0.00 - 0.05 10*3/mm3    nRBC 0.0 0.0 - 0.2 /100 WBC        Result Review :     Assessment & Plan     Anal pruritus    I counseled him on avoiding excessive anal hygiene and reviewed cleaning regimen with him.  I instructed him to stop all over-the-counter medications.  I instructed him to use Calmoseptine at bedtime and during the day.  Follow-up with me in 3 to 4 weeks if no improvement.  We can consider rubber band ligation in the near future after his pruritus has improved.  All questions answered.  He agrees with the plan.              This document has been electronically signed by Flavio Randhawa MD  August 19, 2022 08:41 EDT

## 2022-09-21 ENCOUNTER — OFFICE VISIT (OUTPATIENT)
Dept: SURGERY | Facility: CLINIC | Age: 67
End: 2022-09-21

## 2022-09-21 VITALS — RESPIRATION RATE: 14 BRPM | BODY MASS INDEX: 30.98 KG/M2 | WEIGHT: 209.2 LBS | HEIGHT: 69 IN

## 2022-09-21 DIAGNOSIS — K64.8 INTERNAL HEMORRHOID: Primary | ICD-10-CM

## 2022-09-21 PROCEDURE — 99212 OFFICE O/P EST SF 10 MIN: CPT | Performed by: SURGERY

## 2022-09-21 RX ORDER — NITROGLYCERIN 0.4 MG/1
0.4 TABLET SUBLINGUAL
COMMUNITY
End: 2022-11-09 | Stop reason: SDUPTHER

## 2022-09-22 NOTE — PROGRESS NOTES
General Surgery/Colorectal Surgery Note    Patient Name:  Aram Sarmiento  YOB: 1955  [id number]    Referring Provider: No ref. provider found      Patient Care Team:  Rolando Darling MD as PCP - General (Internal Medicine)  Flavio Randhawa MD as Consulting Physician (General Surgery)    Chief complaint hemorrhoids    Subjective .     History of present illness:    Previously seen.  30-year history of hemorrhoids with occasional hematochezia.  No pain with bowel movements.  No prolapse.      He comes in for follow-up.  His urticaria has improved.  He is still having occasional burning and irritation with occasional painless hematochezia with bowel movements.  No tissue prolapse.  Minimal fiber use.  Colonoscopy 1 year ago normal per the patient.      History:  Past Medical History:   Diagnosis Date   • Carotid artery stenosis     Bilateral   • Fibromyalgia    • Heart murmur    • Hyperlipidemia    • Hypertension    • Myocardial infarction (HCC)    • Personal history of transient ischemic attack (TIA), and cerebral infarction without residual deficits    • Postinfarction angina (HCC)    • Seasonal allergies    • Stroke (HCC)     2004 and 2006   • Weakness     LEFT SIDE FROM CVA       Past Surgical History:   Procedure Laterality Date   • CARDIAC CATHETERIZATION      x2   • CAROTID ENDARTERECTOMY Right 8/29/2019    Procedure: RIGHT CAROTID ENDARTERECTOMY;  Surgeon: Ed Vega MD;  Location: [facility];  Service: Vascular   • CLAVICLE SURGERY Right 2013   • COLONOSCOPY  2016   • LUMBAR FUSION     • SEPTOPLASTY         Family History   Problem Relation Age of Onset   • Lung cancer Mother 80   • COPD Mother    • Heart disease Father    • Heart disease Maternal Grandmother    • Stroke Maternal Grandmother    • Diabetes Maternal Grandmother    • Lung cancer Maternal Grandmother    • Malig Hyperthermia Neg Hx        Social History     Tobacco Use   • Smoking status: Current Every Day  Smoker     Packs/day: 1.00     Types: Cigarettes   • Smokeless tobacco: Never Used   Vaping Use   • Vaping Use: Never used   Substance Use Topics   • Alcohol use: Yes     Alcohol/week: 3.0 standard drinks     Types: 3 Standard drinks or equivalent per week     Comment: q 2 weekly    • Drug use: Never       Review of Systems  All systems were reviewed and negative except for:   Review of Systems   Constitutional: Negative for chills, fever and unexpected weight loss.   HENT: Negative for congestion, nosebleeds and voice change.    Eyes: Negative for blurred vision, double vision and discharge.   Respiratory: Negative for apnea, chest tightness and shortness of breath.    Cardiovascular: Negative for chest pain and leg swelling.   Gastrointestinal:        See HPI   Endocrine: Negative for cold intolerance and heat intolerance.   Genitourinary: Negative for dysuria, hematuria and urgency.   Musculoskeletal: Negative for back pain, joint swelling and neck pain.   Skin: Negative for color change and dry skin.   Neurological: Negative for dizziness and confusion.   Hematological: Negative for adenopathy.   Psychiatric/Behavioral: Negative for agitation and behavioral problems.     MEDS:  Prior to Admission medications    Medication Sig Start Date End Date Taking? Authorizing Provider   amLODIPine (NORVASC) 2.5 MG tablet TAKE 1 TABLET BY MOUTH EVERY DAY 6/15/22  Yes Rolando Darling MD   atorvastatin (LIPITOR) 20 MG tablet TAKE 1 TABLET BY MOUTH EVERY NIGHT 6/15/22  Yes Rolando Darling MD   cetirizine (zyrTEC) 10 MG tablet Take 10 mg by mouth Daily.   Yes Zaid Lagunas MD   clopidogrel (PLAVIX) 75 MG tablet TAKE 1 TABLET BY MOUTH EVERY DAY 5/24/22  Yes Rolando Darling MD   DULoxetine (CYMBALTA) 60 MG capsule TAKE ONE CAPSULE BY MOUTH TWICE DAILY 11/6/21  Yes Rolando Darling MD   nitroglycerin (NITROSTAT) 0.4 MG SL tablet    Yes Zaid Lagunas MD   quinapril (ACCUPRIL) 40 MG  tablet TAKE 1 TABLET BY MOUTH EVERY DAY 9/17/21  Yes Rolando Darling MD   HYDROcodone-acetaminophen (Norco) 5-325 MG per tablet Take 1 tablet by mouth Every 6 (Six) Hours As Needed for Severe Pain . 11/24/21   Lata Valenzuela APRN   lidocaine (LIDODERM) 5 % Place 1 patch on the skin as directed by provider Daily. Remove & Discard patch within 12 hours or as directed by MD 11/24/21   Lata Valenzuela APRN   vitamin B-12 (CYANOCOBALAMIN) 1000 MCG tablet Take 1,000 mcg by mouth Every Night.    Provider, MD Zaid   vitamin D3 125 MCG (5000 UT) capsule capsule Take 5,000 Units by mouth Daily.    Provider, MD Zaid        Allergies:  Penicillins, Diclofenac-misoprostol, and Naproxen    Objective     Vital Signs   Resp:  [14] 14    Physical Exam:     General Appearance:    Alert, cooperative, in no acute distress   Head:    Normocephalic, without obvious abnormality, atraumatic   Eyes:          Conjunctivae and sclerae normal, no icterus,     Ears:    Ears appear intact with no abnormalities noted   Throat:   No oral lesions, no thrush, oral mucosa moist   Neck:   No adenopathy, supple, trachea midline, no thyromegaly   Back:     No kyphosis present, no scoliosis present, no skin lesions,      erythema or scars, no tenderness to percussion or                   palpation,   range of motion normal   Lungs:     Clear to auscultation,respirations regular, even and                  unlabored    Heart:    Regular rhythm and normal rate, normal S1 and S2, no            murmur, no gallop, no rub, no click   Chest Wall:    No abnormalities observed   Abdomen:     Normal bowel sounds, no masses, no organomegaly, soft        non-tender, non-distended, no guarding, no rebound                tenderness   Rectal:        Extremities:   Moves all extremities well, no edema, no cyanosis, no             redness   Pulses:   Pulses palpable and equal bilaterally   Skin:   No bleeding, bruising or rash   Lymph nodes:    "No palpable adenopathy   Neurologic:   A/o x 4 with no deficits       Results Review:   {Results Review:85909::\"I reviewed the patient's new clinical results.\"    LABS/IMAGING:  Results for orders placed or performed in visit on 07/29/22   Hemoglobin A1c    Specimen: Blood   Result Value Ref Range    Hemoglobin A1C 5.80 (H) 4.80 - 5.60 %   Comprehensive Metabolic Panel    Specimen: Blood   Result Value Ref Range    Glucose 99 65 - 99 mg/dL    BUN 15 8 - 23 mg/dL    Creatinine 0.93 0.76 - 1.27 mg/dL    Sodium 141 136 - 145 mmol/L    Potassium 4.2 3.5 - 5.2 mmol/L    Chloride 106 98 - 107 mmol/L    CO2 22.2 22.0 - 29.0 mmol/L    Calcium 8.7 8.6 - 10.5 mg/dL    Total Protein 6.5 6.0 - 8.5 g/dL    Albumin 4.20 3.50 - 5.20 g/dL    ALT (SGPT) 36 1 - 41 U/L    AST (SGOT) 29 1 - 40 U/L    Alkaline Phosphatase 60 39 - 117 U/L    Total Bilirubin 0.5 0.0 - 1.2 mg/dL    Globulin 2.3 gm/dL    A/G Ratio 1.8 g/dL    BUN/Creatinine Ratio 16.1 7.0 - 25.0    Anion Gap 12.8 5.0 - 15.0 mmol/L    eGFR 90.0 >60.0 mL/min/1.73   Lipid Panel    Specimen: Blood   Result Value Ref Range    Total Cholesterol 124 0 - 200 mg/dL    Triglycerides 145 0 - 150 mg/dL    HDL Cholesterol 37 (L) 40 - 60 mg/dL    LDL Cholesterol  62 0 - 100 mg/dL    VLDL Cholesterol 25 5 - 40 mg/dL    LDL/HDL Ratio 1.57    PSA Screen    Specimen: Blood   Result Value Ref Range    PSA 2.190 0.000 - 4.000 ng/mL   CBC Auto Differential    Specimen: Blood   Result Value Ref Range    WBC 6.83 3.40 - 10.80 10*3/mm3    RBC 4.41 4.14 - 5.80 10*6/mm3    Hemoglobin 15.5 13.0 - 17.7 g/dL    Hematocrit 44.6 37.5 - 51.0 %    .1 (H) 79.0 - 97.0 fL    MCH 35.1 (H) 26.6 - 33.0 pg    MCHC 34.8 31.5 - 35.7 g/dL    RDW 13.0 12.3 - 15.4 %    RDW-SD 48.5 37.0 - 54.0 fl    MPV 10.4 6.0 - 12.0 fL    Platelets 217 140 - 450 10*3/mm3    Neutrophil % 62.0 42.7 - 76.0 %    Lymphocyte % 23.9 19.6 - 45.3 %    Monocyte % 7.5 5.0 - 12.0 %    Eosinophil % 5.6 0.3 - 6.2 %    Basophil % 0.7 0.0 - " 1.5 %    Immature Grans % 0.3 0.0 - 0.5 %    Neutrophils, Absolute 4.24 1.70 - 7.00 10*3/mm3    Lymphocytes, Absolute 1.63 0.70 - 3.10 10*3/mm3    Monocytes, Absolute 0.51 0.10 - 0.90 10*3/mm3    Eosinophils, Absolute 0.38 0.00 - 0.40 10*3/mm3    Basophils, Absolute 0.05 0.00 - 0.20 10*3/mm3    Immature Grans, Absolute 0.02 0.00 - 0.05 10*3/mm3    nRBC 0.0 0.0 - 0.2 /100 WBC        Result Review :     Assessment & Plan     Symptomatic internal hemorrhoids    I recommend that he use over-the-counter fiber.  I recommended rubber band ligation.  Benefits and alternatives discussed.  Risk procedure including pain, bleeding, infection discussed.  He will think about it and follow-up if he wishes to proceed.  All questions answered.  He agrees with the plan.  Thank for the consult.           This document has been electronically signed by Flavio Randhawa MD  September 22, 2022 09:27 EDT

## 2022-10-19 ENCOUNTER — OFFICE VISIT (OUTPATIENT)
Dept: SURGERY | Facility: CLINIC | Age: 67
End: 2022-10-19

## 2022-10-19 VITALS — HEIGHT: 69 IN | RESPIRATION RATE: 14 BRPM | BODY MASS INDEX: 30.72 KG/M2 | WEIGHT: 207.4 LBS

## 2022-10-19 DIAGNOSIS — K64.9 HEMORRHOIDS, UNSPECIFIED HEMORRHOID TYPE: Primary | ICD-10-CM

## 2022-10-19 PROCEDURE — 46221 LIGATION OF HEMORRHOID(S): CPT | Performed by: SURGERY

## 2022-10-19 PROCEDURE — 99212 OFFICE O/P EST SF 10 MIN: CPT | Performed by: SURGERY

## 2022-10-20 NOTE — PROGRESS NOTES
General Surgery/Colorectal Surgery Note    Patient Name:  Aram Sarmiento  YOB: 1955  [id number]    Referring Provider: No ref. provider found      Patient Care Team:  Rolando Darling MD as PCP - General (Internal Medicine)  Flavio Randhawa MD as Consulting Physician (General Surgery)    Chief complaint hemorrhoids    Subjective .     History of present illness:    30-year history of hemorrhoids with occasional hematochezia.  No pain with bowel movements.  No prolapse. He follows up for rubber band ligation of his hemorrhoids.  his urticaria has improved.  No changes since last seen.      History:  Past Medical History:   Diagnosis Date   • Carotid artery stenosis     Bilateral   • Fibromyalgia    • Heart murmur    • Hyperlipidemia    • Hypertension    • Myocardial infarction (HCC)    • Personal history of transient ischemic attack (TIA), and cerebral infarction without residual deficits    • Postinfarction angina (HCC)    • Seasonal allergies    • Stroke (HCC)     2004 and 2006   • Weakness     LEFT SIDE FROM CVA       Past Surgical History:   Procedure Laterality Date   • CARDIAC CATHETERIZATION      x2   • CAROTID ENDARTERECTOMY Right 8/29/2019    Procedure: RIGHT CAROTID ENDARTERECTOMY;  Surgeon: Ed Vega MD;  Location: [facility];  Service: Vascular   • CLAVICLE SURGERY Right 2013   • COLONOSCOPY  2016   • LUMBAR FUSION     • SEPTOPLASTY         Family History   Problem Relation Age of Onset   • Lung cancer Mother 80   • COPD Mother    • Heart disease Father    • Heart disease Maternal Grandmother    • Stroke Maternal Grandmother    • Diabetes Maternal Grandmother    • Lung cancer Maternal Grandmother    • Malig Hyperthermia Neg Hx        Social History     Tobacco Use   • Smoking status: Every Day     Packs/day: 1.00     Types: Cigarettes   • Smokeless tobacco: Never   Vaping Use   • Vaping Use: Never used   Substance Use Topics   • Alcohol use: Yes     Alcohol/week:  3.0 standard drinks     Types: 3 Standard drinks or equivalent per week     Comment: q 2 weekly    • Drug use: Never       Review of Systems  All systems were reviewed and negative except for:   Review of Systems   Constitutional: Negative for chills, fever and unexpected weight loss.   HENT: Negative for congestion, nosebleeds and voice change.    Eyes: Negative for blurred vision, double vision and discharge.   Respiratory: Negative for apnea, chest tightness and shortness of breath.    Cardiovascular: Negative for chest pain and leg swelling.   Gastrointestinal:        See HPI   Endocrine: Negative for cold intolerance and heat intolerance.   Genitourinary: Negative for dysuria, hematuria and urgency.   Musculoskeletal: Negative for back pain, joint swelling and neck pain.   Skin: Negative for color change and dry skin.   Neurological: Negative for dizziness and confusion.   Hematological: Negative for adenopathy.   Psychiatric/Behavioral: Negative for agitation and behavioral problems.     MEDS:  Prior to Admission medications    Medication Sig Start Date End Date Taking? Authorizing Provider   amLODIPine (NORVASC) 2.5 MG tablet TAKE 1 TABLET BY MOUTH EVERY DAY 6/15/22  Yes Rolando Darling MD   atorvastatin (LIPITOR) 20 MG tablet TAKE 1 TABLET BY MOUTH EVERY NIGHT 6/15/22  Yes Rolando Darling MD   cetirizine (zyrTEC) 10 MG tablet Take 10 mg by mouth Daily.   Yes Zaid Lagunas MD   clopidogrel (PLAVIX) 75 MG tablet TAKE 1 TABLET BY MOUTH EVERY DAY 5/24/22  Yes Rolando Darling MD   DULoxetine (CYMBALTA) 60 MG capsule TAKE ONE CAPSULE BY MOUTH TWICE DAILY 11/6/21  Yes Rolando Darling MD   lidocaine (LIDODERM) 5 % Place 1 patch on the skin as directed by provider Daily. Remove & Discard patch within 12 hours or as directed by MD 11/24/21  Yes Lata Valenzuela APRN   nitroglycerin (NITROSTAT) 0.4 MG SL tablet    Yes Zaid Lagunas MD   quinapril (ACCUPRIL) 40 MG tablet  "TAKE 1 TABLET BY MOUTH EVERY DAY 9/17/21  Yes Rolando Darling MD   vitamin B-12 (CYANOCOBALAMIN) 1000 MCG tablet Take 1,000 mcg by mouth Every Night.   Yes Provider, MD Zaid   vitamin D3 125 MCG (5000 UT) capsule capsule Take 5,000 Units by mouth Daily.   Yes Provider, MD Zaid   HYDROcodone-acetaminophen (Norco) 5-325 MG per tablet Take 1 tablet by mouth Every 6 (Six) Hours As Needed for Severe Pain . 11/24/21   Lata Valenzuela APRN        Allergies:  Penicillins, Diclofenac-misoprostol, and Naproxen    Objective     Vital Signs   Resp:  [14] 14    Physical Exam:     General Appearance:    Alert, cooperative, in no acute distress   Head:    Normocephalic, without obvious abnormality, atraumatic   Eyes:          Conjunctivae and sclerae normal, no icterus,     Ears:    Ears appear intact with no abnormalities noted   Throat:   No oral lesions, no thrush, oral mucosa moist   Neck:   No adenopathy, supple, trachea midline, no thyromegaly   Back:     No kyphosis present, no scoliosis present, no skin lesions,      erythema or scars, no tenderness to percussion or                   palpation,   range of motion normal   Lungs:     Clear to auscultation,respirations regular, even and                  unlabored    Heart:    Regular rhythm and normal rate, normal S1 and S2, no            murmur, no gallop, no rub, no click   Chest Wall:    No abnormalities observed   Abdomen:     Normal bowel sounds, no masses, no organomegaly, soft        non-tender, non-distended, no guarding, no rebound                tenderness   Rectal:    No fissure, no fistula, normal tone, no mass   Extremities:   Moves all extremities well, no edema, no cyanosis, no             redness   Pulses:   Pulses palpable and equal bilaterally   Skin:   No bleeding, bruising or rash   Lymph nodes:   No palpable adenopathy   Neurologic:   A/o x 4 with no deficits       Results Review:   {Results Review:66017::\"I reviewed the patient's " "new clinical results.\"    LABS/IMAGING:  Results for orders placed or performed in visit on 07/29/22   Hemoglobin A1c    Specimen: Blood   Result Value Ref Range    Hemoglobin A1C 5.80 (H) 4.80 - 5.60 %   Comprehensive Metabolic Panel    Specimen: Blood   Result Value Ref Range    Glucose 99 65 - 99 mg/dL    BUN 15 8 - 23 mg/dL    Creatinine 0.93 0.76 - 1.27 mg/dL    Sodium 141 136 - 145 mmol/L    Potassium 4.2 3.5 - 5.2 mmol/L    Chloride 106 98 - 107 mmol/L    CO2 22.2 22.0 - 29.0 mmol/L    Calcium 8.7 8.6 - 10.5 mg/dL    Total Protein 6.5 6.0 - 8.5 g/dL    Albumin 4.20 3.50 - 5.20 g/dL    ALT (SGPT) 36 1 - 41 U/L    AST (SGOT) 29 1 - 40 U/L    Alkaline Phosphatase 60 39 - 117 U/L    Total Bilirubin 0.5 0.0 - 1.2 mg/dL    Globulin 2.3 gm/dL    A/G Ratio 1.8 g/dL    BUN/Creatinine Ratio 16.1 7.0 - 25.0    Anion Gap 12.8 5.0 - 15.0 mmol/L    eGFR 90.0 >60.0 mL/min/1.73   Lipid Panel    Specimen: Blood   Result Value Ref Range    Total Cholesterol 124 0 - 200 mg/dL    Triglycerides 145 0 - 150 mg/dL    HDL Cholesterol 37 (L) 40 - 60 mg/dL    LDL Cholesterol  62 0 - 100 mg/dL    VLDL Cholesterol 25 5 - 40 mg/dL    LDL/HDL Ratio 1.57    PSA Screen    Specimen: Blood   Result Value Ref Range    PSA 2.190 0.000 - 4.000 ng/mL   CBC Auto Differential    Specimen: Blood   Result Value Ref Range    WBC 6.83 3.40 - 10.80 10*3/mm3    RBC 4.41 4.14 - 5.80 10*6/mm3    Hemoglobin 15.5 13.0 - 17.7 g/dL    Hematocrit 44.6 37.5 - 51.0 %    .1 (H) 79.0 - 97.0 fL    MCH 35.1 (H) 26.6 - 33.0 pg    MCHC 34.8 31.5 - 35.7 g/dL    RDW 13.0 12.3 - 15.4 %    RDW-SD 48.5 37.0 - 54.0 fl    MPV 10.4 6.0 - 12.0 fL    Platelets 217 140 - 450 10*3/mm3    Neutrophil % 62.0 42.7 - 76.0 %    Lymphocyte % 23.9 19.6 - 45.3 %    Monocyte % 7.5 5.0 - 12.0 %    Eosinophil % 5.6 0.3 - 6.2 %    Basophil % 0.7 0.0 - 1.5 %    Immature Grans % 0.3 0.0 - 0.5 %    Neutrophils, Absolute 4.24 1.70 - 7.00 10*3/mm3    Lymphocytes, Absolute 1.63 0.70 - 3.10 " 10*3/mm3    Monocytes, Absolute 0.51 0.10 - 0.90 10*3/mm3    Eosinophils, Absolute 0.38 0.00 - 0.40 10*3/mm3    Basophils, Absolute 0.05 0.00 - 0.20 10*3/mm3    Immature Grans, Absolute 0.02 0.00 - 0.05 10*3/mm3    nRBC 0.0 0.0 - 0.2 /100 WBC        Result Review :     Assessment & Plan     Bleeding internal hemorrhoid    I recommended rubber band ligation.  I explained the procedure and recovery.  Benefits and alternatives discussed.  Risk procedure including bleeding, pain, infection discussed.  All questions answered.  Consent obtained.  He tolerated the procedure well.  He was instructed to call for any fever, unable to urinate, pelvic pain.  Follow-up with me in 4 to 6 weeks if still having bleeding from his hemorrhoids.  Continue fiber.  All questions answered.  He agrees with the plan.  Thank for the consult.    Procedure note  Preoperative and postoperative diagnosis bleeding internal hemorrhoids, Surgeon Sydnee, procedure anoscopy with rubber band ligation of left lateral and right anterior grade 2-3 internal hemorrhoids, anesthesia none, estimated blood loss minimal, complications none              This document has been electronically signed by Flavio Randhawa MD  October 20, 2022 09:41 EDT

## 2022-11-08 ENCOUNTER — HOSPITAL ENCOUNTER (INPATIENT)
Facility: HOSPITAL | Age: 67
LOS: 1 days | Discharge: LEFT BEFORE DISCHARGE | End: 2022-11-09
Attending: EMERGENCY MEDICINE | Admitting: INTERNAL MEDICINE

## 2022-11-08 ENCOUNTER — APPOINTMENT (OUTPATIENT)
Dept: GENERAL RADIOLOGY | Facility: HOSPITAL | Age: 67
End: 2022-11-08

## 2022-11-08 DIAGNOSIS — R77.8 ELEVATED TROPONIN: ICD-10-CM

## 2022-11-08 DIAGNOSIS — R07.89 CHEST DISCOMFORT: Primary | ICD-10-CM

## 2022-11-08 LAB
ALBUMIN SERPL-MCNC: 4.1 G/DL (ref 3.5–5.2)
ALBUMIN/GLOB SERPL: 1.3 G/DL
ALP SERPL-CCNC: 85 U/L (ref 39–117)
ALT SERPL W P-5'-P-CCNC: 23 U/L (ref 1–41)
ANION GAP SERPL CALCULATED.3IONS-SCNC: 11.5 MMOL/L (ref 5–15)
AST SERPL-CCNC: 25 U/L (ref 1–40)
BASOPHILS # BLD AUTO: 0.07 10*3/MM3 (ref 0–0.2)
BASOPHILS NFR BLD AUTO: 0.8 % (ref 0–1.5)
BILIRUB SERPL-MCNC: 0.2 MG/DL (ref 0–1.2)
BUN SERPL-MCNC: 17 MG/DL (ref 8–23)
BUN/CREAT SERPL: 16.2 (ref 7–25)
CALCIUM SPEC-SCNC: 9.5 MG/DL (ref 8.6–10.5)
CHLORIDE SERPL-SCNC: 102 MMOL/L (ref 98–107)
CO2 SERPL-SCNC: 28.5 MMOL/L (ref 22–29)
CREAT SERPL-MCNC: 1.05 MG/DL (ref 0.76–1.27)
DEPRECATED RDW RBC AUTO: 48.5 FL (ref 37–54)
EGFRCR SERPLBLD CKD-EPI 2021: 77.8 ML/MIN/1.73
EOSINOPHIL # BLD AUTO: 0.34 10*3/MM3 (ref 0–0.4)
EOSINOPHIL NFR BLD AUTO: 4 % (ref 0.3–6.2)
ERYTHROCYTE [DISTWIDTH] IN BLOOD BY AUTOMATED COUNT: 13.1 % (ref 12.3–15.4)
GLOBULIN UR ELPH-MCNC: 3.1 GM/DL
GLUCOSE SERPL-MCNC: 141 MG/DL (ref 65–99)
HCT VFR BLD AUTO: 45.8 % (ref 37.5–51)
HGB BLD-MCNC: 16.1 G/DL (ref 13–17.7)
HOLD SPECIMEN: NORMAL
IMM GRANULOCYTES # BLD AUTO: 0.03 10*3/MM3 (ref 0–0.05)
IMM GRANULOCYTES NFR BLD AUTO: 0.4 % (ref 0–0.5)
LIPASE SERPL-CCNC: 35 U/L (ref 13–60)
LYMPHOCYTES # BLD AUTO: 2.16 10*3/MM3 (ref 0.7–3.1)
LYMPHOCYTES NFR BLD AUTO: 25.3 % (ref 19.6–45.3)
MAGNESIUM SERPL-MCNC: 1.8 MG/DL (ref 1.6–2.4)
MCH RBC QN AUTO: 35.2 PG (ref 26.6–33)
MCHC RBC AUTO-ENTMCNC: 35.2 G/DL (ref 31.5–35.7)
MCV RBC AUTO: 100 FL (ref 79–97)
MONOCYTES # BLD AUTO: 0.43 10*3/MM3 (ref 0.1–0.9)
MONOCYTES NFR BLD AUTO: 5 % (ref 5–12)
NEUTROPHILS NFR BLD AUTO: 5.51 10*3/MM3 (ref 1.7–7)
NEUTROPHILS NFR BLD AUTO: 64.5 % (ref 42.7–76)
NRBC BLD AUTO-RTO: 0 /100 WBC (ref 0–0.2)
NT-PROBNP SERPL-MCNC: 2625 PG/ML (ref 0–900)
PLATELET # BLD AUTO: 216 10*3/MM3 (ref 140–450)
PMV BLD AUTO: 10.4 FL (ref 6–12)
POTASSIUM SERPL-SCNC: 4.2 MMOL/L (ref 3.5–5.2)
PROT SERPL-MCNC: 7.2 G/DL (ref 6–8.5)
RBC # BLD AUTO: 4.58 10*6/MM3 (ref 4.14–5.8)
SODIUM SERPL-SCNC: 142 MMOL/L (ref 136–145)
TROPONIN I SERPL-MCNC: 0.37 NG/ML (ref 0–0.08)
TROPONIN I SERPL-MCNC: 0.55 NG/ML (ref 0–0.08)
WBC NRBC COR # BLD: 8.54 10*3/MM3 (ref 3.4–10.8)
WHOLE BLOOD HOLD COAG: NORMAL
WHOLE BLOOD HOLD SPECIMEN: NORMAL

## 2022-11-08 PROCEDURE — 83735 ASSAY OF MAGNESIUM: CPT

## 2022-11-08 PROCEDURE — 36415 COLL VENOUS BLD VENIPUNCTURE: CPT

## 2022-11-08 PROCEDURE — 85025 COMPLETE CBC W/AUTO DIFF WBC: CPT

## 2022-11-08 PROCEDURE — 80053 COMPREHEN METABOLIC PANEL: CPT

## 2022-11-08 PROCEDURE — 93005 ELECTROCARDIOGRAM TRACING: CPT | Performed by: EMERGENCY MEDICINE

## 2022-11-08 PROCEDURE — 83690 ASSAY OF LIPASE: CPT

## 2022-11-08 PROCEDURE — 84484 ASSAY OF TROPONIN QUANT: CPT

## 2022-11-08 PROCEDURE — 93005 ELECTROCARDIOGRAM TRACING: CPT

## 2022-11-08 PROCEDURE — 99285 EMERGENCY DEPT VISIT HI MDM: CPT

## 2022-11-08 PROCEDURE — 84484 ASSAY OF TROPONIN QUANT: CPT | Performed by: INTERNAL MEDICINE

## 2022-11-08 PROCEDURE — 71045 X-RAY EXAM CHEST 1 VIEW: CPT

## 2022-11-08 PROCEDURE — 83880 ASSAY OF NATRIURETIC PEPTIDE: CPT

## 2022-11-08 RX ORDER — ASPIRIN 81 MG/1
324 TABLET, CHEWABLE ORAL ONCE
Status: COMPLETED | OUTPATIENT
Start: 2022-11-08 | End: 2022-11-08

## 2022-11-08 RX ORDER — SODIUM CHLORIDE 0.9 % (FLUSH) 0.9 %
10 SYRINGE (ML) INJECTION AS NEEDED
Status: DISCONTINUED | OUTPATIENT
Start: 2022-11-08 | End: 2022-11-09 | Stop reason: HOSPADM

## 2022-11-08 RX ORDER — ENOXAPARIN SODIUM 100 MG/ML
1 INJECTION SUBCUTANEOUS ONCE
Status: COMPLETED | OUTPATIENT
Start: 2022-11-08 | End: 2022-11-09

## 2022-11-08 RX ADMIN — NITROGLYCERIN 0.5 INCH: 20 OINTMENT TOPICAL at 23:28

## 2022-11-08 RX ADMIN — ASPIRIN 81 MG 324 MG: 81 TABLET ORAL at 23:28

## 2022-11-09 ENCOUNTER — TELEPHONE (OUTPATIENT)
Dept: CARDIOLOGY | Facility: CLINIC | Age: 67
End: 2022-11-09

## 2022-11-09 VITALS
HEIGHT: 69 IN | OXYGEN SATURATION: 94 % | TEMPERATURE: 98.1 F | DIASTOLIC BLOOD PRESSURE: 67 MMHG | BODY MASS INDEX: 31.02 KG/M2 | RESPIRATION RATE: 22 BRPM | HEART RATE: 55 BPM | WEIGHT: 209.44 LBS | SYSTOLIC BLOOD PRESSURE: 161 MMHG

## 2022-11-09 PROBLEM — I21.4 NON-STEMI (NON-ST ELEVATED MYOCARDIAL INFARCTION): Status: ACTIVE | Noted: 2022-11-09

## 2022-11-09 LAB
ALBUMIN SERPL-MCNC: 4.2 G/DL (ref 3.5–5.2)
ALBUMIN/GLOB SERPL: 1.4 G/DL
ALP SERPL-CCNC: 74 U/L (ref 39–117)
ALT SERPL W P-5'-P-CCNC: 24 U/L (ref 1–41)
ANION GAP SERPL CALCULATED.3IONS-SCNC: 11.9 MMOL/L (ref 5–15)
AST SERPL-CCNC: 30 U/L (ref 1–40)
BASOPHILS # BLD AUTO: 0.05 10*3/MM3 (ref 0–0.2)
BASOPHILS NFR BLD AUTO: 0.6 % (ref 0–1.5)
BILIRUB SERPL-MCNC: 0.4 MG/DL (ref 0–1.2)
BUN SERPL-MCNC: 16 MG/DL (ref 8–23)
BUN/CREAT SERPL: 16.7 (ref 7–25)
CALCIUM SPEC-SCNC: 9.4 MG/DL (ref 8.6–10.5)
CHLORIDE SERPL-SCNC: 103 MMOL/L (ref 98–107)
CHOLEST SERPL-MCNC: 130 MG/DL (ref 0–200)
CO2 SERPL-SCNC: 24.1 MMOL/L (ref 22–29)
CREAT SERPL-MCNC: 0.96 MG/DL (ref 0.76–1.27)
DEPRECATED RDW RBC AUTO: 45.5 FL (ref 37–54)
EGFRCR SERPLBLD CKD-EPI 2021: 86.6 ML/MIN/1.73
EOSINOPHIL # BLD AUTO: 0.41 10*3/MM3 (ref 0–0.4)
EOSINOPHIL NFR BLD AUTO: 4.8 % (ref 0.3–6.2)
ERYTHROCYTE [DISTWIDTH] IN BLOOD BY AUTOMATED COUNT: 12.6 % (ref 12.3–15.4)
GLOBULIN UR ELPH-MCNC: 2.9 GM/DL
GLUCOSE SERPL-MCNC: 111 MG/DL (ref 65–99)
HCT VFR BLD AUTO: 44 % (ref 37.5–51)
HDLC SERPL-MCNC: 42 MG/DL (ref 40–60)
HGB BLD-MCNC: 15.8 G/DL (ref 13–17.7)
IMM GRANULOCYTES # BLD AUTO: 0.02 10*3/MM3 (ref 0–0.05)
IMM GRANULOCYTES NFR BLD AUTO: 0.2 % (ref 0–0.5)
LDLC SERPL CALC-MCNC: 58 MG/DL (ref 0–100)
LDLC/HDLC SERPL: 1.23 {RATIO}
LYMPHOCYTES # BLD AUTO: 2.42 10*3/MM3 (ref 0.7–3.1)
LYMPHOCYTES NFR BLD AUTO: 28.5 % (ref 19.6–45.3)
MCH RBC QN AUTO: 35.3 PG (ref 26.6–33)
MCHC RBC AUTO-ENTMCNC: 35.9 G/DL (ref 31.5–35.7)
MCV RBC AUTO: 98.4 FL (ref 79–97)
MONOCYTES # BLD AUTO: 0.68 10*3/MM3 (ref 0.1–0.9)
MONOCYTES NFR BLD AUTO: 8 % (ref 5–12)
NEUTROPHILS NFR BLD AUTO: 4.91 10*3/MM3 (ref 1.7–7)
NEUTROPHILS NFR BLD AUTO: 57.9 % (ref 42.7–76)
NRBC BLD AUTO-RTO: 0 /100 WBC (ref 0–0.2)
PLATELET # BLD AUTO: 193 10*3/MM3 (ref 140–450)
PMV BLD AUTO: 10.3 FL (ref 6–12)
POTASSIUM SERPL-SCNC: 4.4 MMOL/L (ref 3.5–5.2)
PROT SERPL-MCNC: 7.1 G/DL (ref 6–8.5)
QT INTERVAL: 505 MS
QT INTERVAL: 597 MS
RBC # BLD AUTO: 4.47 10*6/MM3 (ref 4.14–5.8)
SODIUM SERPL-SCNC: 139 MMOL/L (ref 136–145)
TRIGL SERPL-MCNC: 182 MG/DL (ref 0–150)
TROPONIN T SERPL-MCNC: 0.19 NG/ML (ref 0–0.03)
TROPONIN T SERPL-MCNC: 0.39 NG/ML (ref 0–0.03)
VLDLC SERPL-MCNC: 30 MG/DL (ref 5–40)
WBC NRBC COR # BLD: 8.49 10*3/MM3 (ref 3.4–10.8)

## 2022-11-09 PROCEDURE — 84484 ASSAY OF TROPONIN QUANT: CPT | Performed by: INTERNAL MEDICINE

## 2022-11-09 PROCEDURE — 99236 HOSP IP/OBS SAME DATE HI 85: CPT | Performed by: INTERNAL MEDICINE

## 2022-11-09 PROCEDURE — 80061 LIPID PANEL: CPT | Performed by: INTERNAL MEDICINE

## 2022-11-09 PROCEDURE — 25010000002 ENOXAPARIN PER 10 MG: Performed by: EMERGENCY MEDICINE

## 2022-11-09 PROCEDURE — 80053 COMPREHEN METABOLIC PANEL: CPT | Performed by: INTERNAL MEDICINE

## 2022-11-09 PROCEDURE — 99222 1ST HOSP IP/OBS MODERATE 55: CPT | Performed by: INTERNAL MEDICINE

## 2022-11-09 PROCEDURE — 94799 UNLISTED PULMONARY SVC/PX: CPT

## 2022-11-09 PROCEDURE — 85025 COMPLETE CBC W/AUTO DIFF WBC: CPT | Performed by: INTERNAL MEDICINE

## 2022-11-09 RX ORDER — NITROGLYCERIN 0.4 MG/1
0.4 TABLET SUBLINGUAL
Status: DISCONTINUED | OUTPATIENT
Start: 2022-11-09 | End: 2022-11-09 | Stop reason: HOSPADM

## 2022-11-09 RX ORDER — SODIUM CHLORIDE 0.9 % (FLUSH) 0.9 %
10 SYRINGE (ML) INJECTION EVERY 12 HOURS SCHEDULED
Status: DISCONTINUED | OUTPATIENT
Start: 2022-11-09 | End: 2022-11-09 | Stop reason: HOSPADM

## 2022-11-09 RX ORDER — ASPIRIN 81 MG/1
81 TABLET ORAL DAILY
Qty: 90 TABLET | Refills: 0 | Status: SHIPPED | OUTPATIENT
Start: 2022-11-09 | End: 2023-01-24 | Stop reason: HOSPADM

## 2022-11-09 RX ORDER — ACETAMINOPHEN 160 MG/5ML
650 SOLUTION ORAL EVERY 4 HOURS PRN
Status: DISCONTINUED | OUTPATIENT
Start: 2022-11-09 | End: 2022-11-09 | Stop reason: HOSPADM

## 2022-11-09 RX ORDER — CETIRIZINE HYDROCHLORIDE 10 MG/1
10 TABLET ORAL DAILY
Status: DISCONTINUED | OUTPATIENT
Start: 2022-11-09 | End: 2022-11-09 | Stop reason: HOSPADM

## 2022-11-09 RX ORDER — ACETAMINOPHEN 325 MG/1
650 TABLET ORAL EVERY 4 HOURS PRN
Status: DISCONTINUED | OUTPATIENT
Start: 2022-11-09 | End: 2022-11-09 | Stop reason: HOSPADM

## 2022-11-09 RX ORDER — MORPHINE SULFATE 2 MG/ML
2 INJECTION, SOLUTION INTRAMUSCULAR; INTRAVENOUS EVERY 4 HOURS PRN
Status: DISCONTINUED | OUTPATIENT
Start: 2022-11-09 | End: 2022-11-09 | Stop reason: HOSPADM

## 2022-11-09 RX ORDER — ONDANSETRON 2 MG/ML
4 INJECTION INTRAMUSCULAR; INTRAVENOUS EVERY 6 HOURS PRN
Status: DISCONTINUED | OUTPATIENT
Start: 2022-11-09 | End: 2022-11-09 | Stop reason: HOSPADM

## 2022-11-09 RX ORDER — SODIUM CHLORIDE 9 MG/ML
40 INJECTION, SOLUTION INTRAVENOUS AS NEEDED
Status: DISCONTINUED | OUTPATIENT
Start: 2022-11-09 | End: 2022-11-09 | Stop reason: HOSPADM

## 2022-11-09 RX ORDER — ENOXAPARIN SODIUM 100 MG/ML
100 INJECTION SUBCUTANEOUS EVERY 12 HOURS
Status: DISCONTINUED | OUTPATIENT
Start: 2022-11-09 | End: 2022-11-09 | Stop reason: HOSPADM

## 2022-11-09 RX ORDER — SODIUM CHLORIDE, SODIUM LACTATE, POTASSIUM CHLORIDE, CALCIUM CHLORIDE 600; 310; 30; 20 MG/100ML; MG/100ML; MG/100ML; MG/100ML
75 INJECTION, SOLUTION INTRAVENOUS CONTINUOUS
Status: DISCONTINUED | OUTPATIENT
Start: 2022-11-09 | End: 2022-11-09

## 2022-11-09 RX ORDER — NITROGLYCERIN 0.4 MG/1
0.4 TABLET SUBLINGUAL
Qty: 12 TABLET | Refills: 0 | Status: SHIPPED | OUTPATIENT
Start: 2022-11-09

## 2022-11-09 RX ORDER — ATORVASTATIN CALCIUM 40 MG/1
40 TABLET, FILM COATED ORAL NIGHTLY
Status: DISCONTINUED | OUTPATIENT
Start: 2022-11-09 | End: 2022-11-09 | Stop reason: HOSPADM

## 2022-11-09 RX ORDER — ACETAMINOPHEN 650 MG/1
650 SUPPOSITORY RECTAL EVERY 4 HOURS PRN
Status: DISCONTINUED | OUTPATIENT
Start: 2022-11-09 | End: 2022-11-09 | Stop reason: HOSPADM

## 2022-11-09 RX ORDER — SODIUM CHLORIDE 0.9 % (FLUSH) 0.9 %
10 SYRINGE (ML) INJECTION AS NEEDED
Status: DISCONTINUED | OUTPATIENT
Start: 2022-11-09 | End: 2022-11-09 | Stop reason: HOSPADM

## 2022-11-09 RX ORDER — DULOXETIN HYDROCHLORIDE 30 MG/1
60 CAPSULE, DELAYED RELEASE ORAL 2 TIMES DAILY
Status: DISCONTINUED | OUTPATIENT
Start: 2022-11-09 | End: 2022-11-09 | Stop reason: HOSPADM

## 2022-11-09 RX ORDER — NALOXONE HCL 0.4 MG/ML
0.4 VIAL (ML) INJECTION
Status: DISCONTINUED | OUTPATIENT
Start: 2022-11-09 | End: 2022-11-09 | Stop reason: HOSPADM

## 2022-11-09 RX ADMIN — CETIRIZINE HYDROCHLORIDE 10 MG: 10 TABLET, FILM COATED ORAL at 10:34

## 2022-11-09 RX ADMIN — DULOXETINE 60 MG: 30 CAPSULE, DELAYED RELEASE ORAL at 10:33

## 2022-11-09 RX ADMIN — ENOXAPARIN SODIUM 100 MG: 100 INJECTION SUBCUTANEOUS at 00:12

## 2022-11-09 NOTE — PLAN OF CARE
Problem: Adult Inpatient Plan of Care  Goal: Plan of Care Review  Outcome: Ongoing, Progressing  Goal: Patient-Specific Goal (Individualized)  Outcome: Ongoing, Progressing  Goal: Absence of Hospital-Acquired Illness or Injury  Outcome: Ongoing, Progressing  Intervention: Identify and Manage Fall Risk  Recent Flowsheet Documentation  Taken 11/9/2022 0210 by Romy Thompson RN  Safety Promotion/Fall Prevention: safety round/check completed  Intervention: Prevent and Manage VTE (Venous Thromboembolism) Risk  Recent Flowsheet Documentation  Taken 11/9/2022 0210 by Romy Thompson RN  Activity Management: activity adjusted per tolerance  Goal: Optimal Comfort and Wellbeing  Outcome: Ongoing, Progressing  Intervention: Provide Person-Centered Care  Recent Flowsheet Documentation  Taken 11/9/2022 0210 by Romy Thompson RN  Trust Relationship/Rapport:   care explained   questions encouraged  Goal: Readiness for Transition of Care  Outcome: Ongoing, Progressing  Intervention: Mutually Develop Transition Plan  Recent Flowsheet Documentation  Taken 11/9/2022 0205 by Romy Thompson RN  Transportation Anticipated: family or friend will provide  Patient/Family Anticipated Services at Transition: none  Patient/Family Anticipates Transition to: home  Taken 11/9/2022 0204 by Romy Thompson RN  Equipment Currently Used at Home: none     Problem: Heart Failure Comorbidity  Goal: Maintenance of Heart Failure Symptom Control  Outcome: Ongoing, Progressing     Problem: Hypertension Comorbidity  Goal: Blood Pressure in Desired Range  Outcome: Ongoing, Progressing     Problem: Chest Pain  Goal: Resolution of Chest Pain Symptoms  Outcome: Ongoing, Progressing   Goal Outcome Evaluation:
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
## 2022-11-09 NOTE — CASE MANAGEMENT/SOCIAL WORK
Discharge Planning Assessment   Brandy     Patient Name: Aram Sarmiento  MRN: [id number]  Today's Date: 11/9/2022    Admit Date: 11/8/2022    Plan: Pt has been vaccinated for Covid. Pt plans to return home at discharge. Pt has no fin. issues, Pt lives with wife.   Discharge Needs Assessment     Row Name 11/09/22 1012       Living Environment    People in Home spouse    Current Living Arrangements home    Primary Care Provided by self    Provides Primary Care For no one    Family Caregiver if Needed spouse    Quality of Family Relationships helpful;involved;supportive       Resource/Environmental Concerns    Resource/Environmental Concerns none       Transition Planning    Patient/Family Anticipates Transition to home with family;home       Discharge Needs Assessment    Discharge Coordination/Progress Pt has been vaccinated for Covid. Pt plans to return home at discharge. Pt has no fin. issues, Pt lives with wife.               Discharge Plan     Row Name 11/09/22 1050       Plan    Plan Pt has been vaccinated for Covid. Pt plans to return home at discharge. Pt has no fin. issues, Pt lives with wife.              Continued Care and Services - Admitted Since 11/8/2022    Coordination has not been started for this encounter.          Demographic Summary     Row Name 11/09/22 1008       General Information    Admission Type inpatient    Arrived From emergency department    Referral Source admission list    Reason for Consult discharge planning    Preferred Language English       Contact Information    Permission Granted to Share Info With lay caregiver    Contact Information Obtained for lay caregiver       Lay Caregiver Information    Name, Lay Caregiver Jessica Guillermina    Phone, Lay Caregiver [phone]               Functional Status     Row Name 11/09/22 1010       Functional Status    Usual Activity Tolerance excellent    Current Activity Tolerance excellent       Assessment of Health Literacy    How often do you  have someone help you read hospital materials? Never    How often do you have problems learning about your medical condition because of difficulty understanding written information? Never    How often do you have a problem understanding what is told to you about your medical condition? Never    How confident are you filling out medical forms by yourself? Extremely    Health Literacy Excellent       Functional Status, IADL    Medications independent    Meal Preparation independent    Housekeeping independent    Laundry independent    Shopping independent       Mental Status    General Appearance WDL WDL       Employment/    Employment Status retired               Psychosocial    No documentation.                Abuse/Neglect    No documentation.                Legal     Row Name 11/09/22 1010       Financial/Legal    Source of Income social security;pension/FDC    Application for Public Assistance not applied       Legal    Criminal Activity/Legal Involvement none               Substance Abuse    No documentation.                Patient Forms    No documentation.                   Mouna Vasquez

## 2022-11-09 NOTE — NURSING NOTE
Pt still refusing monitor and disappeared off the floor, again. Pt returned and has been educated again on hospital policy about leaving the floor. Evelyn HESTER spoke with patient earlier as well. I have re-iterated numerous times about the importance of wearing his heart monitor and remaining NPO to no avail.     Pt's roommate in [address] has been attempting to get involved with the situation as well. Instigating and stating that keeping the PT NPO and not allowing him to be able to smoke is going to cause him to have a heart attack. He frequently comes over to bed 1 while I am attempting to talk with my patient and interrupts our conversation. Pt in bed 2 has been educated as well.
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
## 2022-11-09 NOTE — ED PROVIDER NOTES
Time: 10:35 PM EST  Chief Complaint: Chest pain        History of Present Illness:    Patient is a 66 y.o. year old male who presents to the emergency department with mid-sternal chest discomfort onset Sunday afternoon. Pt states the pain was constant Sunday and Monday, was gone this morning, and then came on again around 1 PM today. Pt states the initial discomfort came on with exertion, but came on today with relative rest. Pt states the discomfort radiates to the anterior BL neck, but not to his back. Pt describes the pain as heavy and dull. Pt denies SOB, nausea, diaphoresis. Pt reports a previous heart attack, but states that this does not feel like this. Pt denies associated exacerbations or things that make the pain better. Pt denies new abdominal ain or back pain. Pt denies fever, rigors, and myalgias.   Pt has a hx of HTN, high cholesterol, but no diabetes. Pt is a smoker and a hx of coronary artery disease. Pt's cardiologist is at [facility]. Pt denies recent stress test. Pt denies hx of cardiac stents. Pt denies recent immobilization, recent surgery, cancer, and unilateral leg pain or swelling. Pt denies hx of DVT or PE.         History provided by:  Patient   used: No            Patient Care Team  Primary Care Provider: Rolando Darling MD    Past Medical History:     Allergies   Allergen Reactions   • Penicillins Shortness Of Breath   • Diclofenac-Misoprostol Unknown - High Severity   • Naproxen Rash     Past Medical History:   Diagnosis Date   • Carotid artery stenosis     Bilateral   • Fibromyalgia    • Heart murmur    • Hyperlipidemia    • Hypertension    • Myocardial infarction (HCC)    • Personal history of transient ischemic attack (TIA), and cerebral infarction without residual deficits    • Postinfarction angina (HCC)    • Seasonal allergies    • Stroke (HCC)     2004 and 2006   • Weakness     LEFT SIDE FROM CVA     Past Surgical History:   Procedure Laterality Date    • CARDIAC CATHETERIZATION      x2   • CAROTID ENDARTERECTOMY Right 8/29/2019    Procedure: RIGHT CAROTID ENDARTERECTOMY;  Surgeon: Ed Vega MD;  Location: [facility];  Service: Vascular   • CLAVICLE SURGERY Right 2013   • COLONOSCOPY  2016   • LUMBAR FUSION     • SEPTOPLASTY       Family History   Problem Relation Age of Onset   • Lung cancer Mother 80   • COPD Mother    • Heart disease Father    • Heart disease Maternal Grandmother    • Stroke Maternal Grandmother    • Diabetes Maternal Grandmother    • Lung cancer Maternal Grandmother    • Malig Hyperthermia Neg Hx        Home Medications:  Prior to Admission medications    Medication Sig Start Date End Date Taking? Authorizing Provider   amLODIPine (NORVASC) 2.5 MG tablet TAKE 1 TABLET BY MOUTH EVERY DAY 6/15/22   Rolando Darling MD   atorvastatin (LIPITOR) 20 MG tablet TAKE 1 TABLET BY MOUTH EVERY NIGHT 6/15/22   Rolando Darling MD   cetirizine (zyrTEC) 10 MG tablet Take 10 mg by mouth Daily.    Provider, MD Zaid   clopidogrel (PLAVIX) 75 MG tablet TAKE 1 TABLET BY MOUTH EVERY DAY 5/24/22   Rolando Darling MD   DULoxetine (CYMBALTA) 60 MG capsule TAKE ONE CAPSULE BY MOUTH TWICE DAILY 11/6/21   Rolando Darling MD   HYDROcodone-acetaminophen (Norco) 5-325 MG per tablet Take 1 tablet by mouth Every 6 (Six) Hours As Needed for Severe Pain . 11/24/21   Lata Valenzuela APRN   lidocaine (LIDODERM) 5 % Place 1 patch on the skin as directed by provider Daily. Remove & Discard patch within 12 hours or as directed by MD 11/24/21   Lata Valenzuela APRN   nitroglycerin (NITROSTAT) 0.4 MG SL tablet     Provider, MD Zaid   quinapril (ACCUPRIL) 40 MG tablet TAKE 1 TABLET BY MOUTH EVERY DAY 9/17/21   Rolando Darling MD   vitamin B-12 (CYANOCOBALAMIN) 1000 MCG tablet Take 1,000 mcg by mouth Every Night.    Provider, MD Zaid   vitamin D3 125 MCG (5000 UT) capsule capsule Take 5,000 Units by mouth  "Daily.    Provider, Historical, MD        Social History:   Social History     Tobacco Use   • Smoking status: Every Day     Packs/day: 1.00     Types: Cigarettes   • Smokeless tobacco: Never   Vaping Use   • Vaping Use: Never used   Substance Use Topics   • Alcohol use: Yes     Alcohol/week: 3.0 standard drinks     Types: 3 Standard drinks or equivalent per week     Comment: q 2 weekly    • Drug use: Never         Review of Systems:  Review of Systems   Constitutional: Negative for chills, diaphoresis and fever.   HENT: Negative for congestion, postnasal drip, rhinorrhea and sore throat.    Eyes: Negative for photophobia.   Respiratory: Negative for cough, chest tightness and shortness of breath.    Cardiovascular: Positive for chest pain. Negative for palpitations and leg swelling.   Gastrointestinal: Negative for abdominal pain, diarrhea, nausea and vomiting.   Genitourinary: Negative for difficulty urinating, dysuria, flank pain, frequency, hematuria and urgency.   Musculoskeletal: Negative for myalgias, neck pain and neck stiffness.   Skin: Negative for pallor and rash.   Neurological: Negative for dizziness, syncope, weakness, numbness and headaches.   Hematological: Negative for adenopathy. Does not bruise/bleed easily.   Psychiatric/Behavioral: Negative.         Physical Exam:  /67 (BP Location: Right arm, Patient Position: Lying)   Pulse 55   Temp 98.1 °F (36.7 °C) (Oral)   Resp 22   Ht 175.3 cm (69\")   Wt 95 kg (209 lb 7 oz)   SpO2 94%   BMI 30.93 kg/m²     Physical Exam  Vitals and nursing note reviewed.   Constitutional:       General: He is not in acute distress.     Appearance: Normal appearance. He is not ill-appearing, toxic-appearing or diaphoretic.   HENT:      Head: Normocephalic and atraumatic.      Mouth/Throat:      Mouth: Mucous membranes are moist.   Eyes:      Pupils: Pupils are equal, round, and reactive to light.   Cardiovascular:      Rate and Rhythm: Regular rhythm. " Bradycardia present.      Pulses: Normal pulses.           Carotid pulses are 2+ on the right side and 2+ on the left side.       Radial pulses are 2+ on the right side and 2+ on the left side.        Femoral pulses are 2+ on the right side and 2+ on the left side.       Popliteal pulses are 2+ on the right side and 2+ on the left side.        Dorsalis pedis pulses are 2+ on the right side and 2+ on the left side.        Posterior tibial pulses are 2+ on the right side and 2+ on the left side.      Heart sounds: Normal heart sounds. No murmur heard.  Pulmonary:      Effort: Pulmonary effort is normal. No accessory muscle usage, respiratory distress or retractions.      Breath sounds: Decreased breath sounds present. No wheezing, rhonchi or rales.      Comments: No worsening pain with inspiration  Chest:      Chest wall: No tenderness.   Abdominal:      General: Abdomen is flat. There is no distension.      Palpations: Abdomen is soft. There is no mass or pulsatile mass.      Tenderness: There is no abdominal tenderness. There is no right CVA tenderness, left CVA tenderness, guarding or rebound.      Comments: No rigidity      Musculoskeletal:         General: No swelling, tenderness or deformity.      Cervical back: Neck supple. No tenderness.      Right lower leg: No tenderness. No edema.      Left lower leg: No tenderness. No edema.   Skin:     General: Skin is warm and dry.      Capillary Refill: Capillary refill takes less than 2 seconds.      Coloration: Skin is not jaundiced or pale.      Findings: No erythema.   Neurological:      General: No focal deficit present.      Mental Status: He is alert and oriented to person, place, and time. Mental status is at baseline.      Sensory: No sensory deficit.      Motor: No weakness.   Psychiatric:         Mood and Affect: Mood normal.         Behavior: Behavior normal.                Medications in the Emergency Department:  Medications   aspirin chewable tablet 324  mg (324 mg Oral Given 11/8/22 2328)   nitroglycerin (NITROSTAT) ointment 0.5 inch (0.5 inches Topical Given 11/8/22 2328)   Enoxaparin Sodium (LOVENOX) syringe 100 mg (100 mg Subcutaneous Given 11/9/22 0012)        Labs  Lab Results (last 24 hours)     ** No results found for the last 24 hours. **           Imaging:  No Radiology Exams Resulted Within Past 24 Hours    Procedures:  Procedures    Progress  ED Course as of 11/12/22 0726 Tue Nov 08, 2022 2117 EKG:    Rhythm: Sinus bradycardia  Rate: 53  Intervals: Normal MI and prolonged QT interval  T-wave: T wave inversion in I and aVL, nonspecific T wave flattening in the lateral leads  ST Segment: Nonspecific ST segment V2, no pathological ST elevation or ST depression to suggest myocardial injury or myocardial ischemia    EKG Comparison: Unavailable for comparison    Interpreted by me   [SD]   2220 PROVIDER IN TRIAGE  Patient was evaluated by me in triage, Tushar Moscoso PA-C.  Orders were placed and patient is currently awaiting final results and disposition.  [MD]   2221 This patient was escalated the charge nurse and provider for bed placement [MD]      ED Course User Index  [MD] Tushar Moscoso PA-C  [SD] Keven Munguia DO                            The patient was initially evaluated in the triage area where orders were placed. The patient was later dispositioned by Keven Munguia DO.      The patient was advised to stay for completion of workup which includes but is not limited to communication of labs and radiological results, reassessment and plan. The patient was advised that leaving prior to disposition by a provider could result in critical findings that are not communicated to the patient.     Medical Decision Making:  MDM  Number of Diagnoses or Management Options  Chest discomfort  Elevated troponin  Diagnosis management comments:     Well's PE = 0    The patient had a low pretest clinical probability Wells score for pulmonary embolism.  The  patient has a normal D-dimer.  The patient understands that they are low risk for pulmonary embolism.  I discussed performing a CAT scan of the chest to rule out pulmonary embolism with the patient.  Due to the fact that the patient is low risk for a pulmonary embolism and then discussing the risks of radiation exposure from the CAT scan,  the patient does not want to have a CAT scan performed at this time.            HEART score = 6    The patient has a high heart score.  The patient was subsequently admitted to the hospital for further valuation of the chest discomfort    The patient had an elevated troponin.  The patient was subsequently given Nitropaste and anticoagulated.  At the time of admission the patient was resting comfortably in no acute distress and no pain         Amount and/or Complexity of Data Reviewed  Clinical lab tests: reviewed  Tests in the radiology section of CPT®: reviewed  Tests in the medicine section of CPT®: reviewed  Discuss the patient with other providers: yes (I discussed the case with the hospitalist who will evaluate the patient in the emergency room and admit the patient to the hospital.  The hospitalist agrees with the treatment plan.  Cardiology will be consulted upon admission)             The following orders were placed after triage and evaluation:  Orders Placed This Encounter   Procedures   • XR Chest 1 View   • [place] Draw   • Comprehensive Metabolic Panel   • Lipase   • BNP   • Magnesium   • CBC Auto Differential   • CBC Auto Differential   • Comprehensive Metabolic Panel   • Troponin   • Troponin   • Lipid Panel   • Undress & Gown   • Continuous Pulse Oximetry   • POC Troponin I   • POC Troponin I   • POC Troponin I   • POC Troponin I   • ECG 12 Lead ED Triage Standing Order; Chest Pain   • SCANNED - TELEMETRY     • Initiate Observation Status   • Inpatient Admission   • Discharge patient   • CBC & Differential   • Green Top (Gel)   • Lavender Top   • Gold Top - SST   •  Light Blue Top   • HOLD Troponin-I Tube       Final diagnoses:   Chest discomfort   Elevated troponin          Disposition:  ED Disposition     ED Disposition   Decision to Admit    Condition   --    Comment   Level of Care: Telemetry [5]   Diagnosis: Chest discomfort [457862]   Certification: I Certify That Inpatient Hospital Services Are Medically Necessary For Greater Than 2 Midnights               This medical record created using voice recognition software.    I, Isabella Johnson, provided documentation assistance for and in the presence of Dr. Munguia.         Isabella Johnson  11/08/22 2552       Isabella Johnson  11/08/22 0874       Keven Munguia DO  11/12/22 0848

## 2022-11-09 NOTE — H&P
[facility] HISTORY AND PHYSICAL  Date: 2022   Patient Name: Aram Sarmiento  : 1955  MRN: [id number]  Primary Care Physician:  Rolando Darling MD  Date of admission: 2022    Subjective   Subjective     Chief Complaint: Chest pain    HPI:    Aram Sarmiento is a 66 y.o. male past medical history of carotid stenosis, CVA, hypertension the presents the emergency department for evaluation of chest pain since .  He describes it as constant, dull, substernal, radiates to neck intermittently, no known aggravating or alleviating factors.  He denies any other fevers, chills, sweats, nausea, vomiting, shortness of breath, palpitations, abdominal pain, diarrhea constipation, dysuria, weakness, rash.  Work-up in the emergency department finds patient to have elevated troponin that has trended upwards.  Patient has received aspirin and therapeutic Lovenox and will be admitted for ongoing monitoring management cardiology evaluation.      Personal History     Past Medical History:  Past Medical History:   Diagnosis Date   • Carotid artery stenosis     Bilateral   • Fibromyalgia    • Heart murmur    • Hyperlipidemia    • Hypertension    • Myocardial infarction (HCC)    • Personal history of transient ischemic attack (TIA), and cerebral infarction without residual deficits    • Postinfarction angina (HCC)    • Seasonal allergies    • Stroke (HCC)      and    • Weakness     LEFT SIDE FROM CVA         Past Surgical History:  Past Surgical History:   Procedure Laterality Date   • CARDIAC CATHETERIZATION      x2   • CAROTID ENDARTERECTOMY Right 2019    Procedure: RIGHT CAROTID ENDARTERECTOMY;  Surgeon: Ed Vega MD;  Location: [facility];  Service: Vascular   • CLAVICLE SURGERY Right    • COLONOSCOPY  2016   • LUMBAR FUSION     • SEPTOPLASTY           Family History:   Family History   Problem Relation Age of Onset   • Lung cancer Mother 80   • COPD Mother    •  Heart disease Father    • Heart disease Maternal Grandmother    • Stroke Maternal Grandmother    • Diabetes Maternal Grandmother    • Lung cancer Maternal Grandmother    • Malig Hyperthermia Neg Hx          Social History:   Social History     Tobacco Use   • Smoking status: Every Day     Packs/day: 1.00     Types: Cigarettes   • Smokeless tobacco: Never   Vaping Use   • Vaping Use: Never used   Substance Use Topics   • Alcohol use: Yes     Alcohol/week: 3.0 standard drinks     Types: 3 Standard drinks or equivalent per week     Comment: q 2 weekly    • Drug use: Never         Home Medications:  DULoxetine, amLODIPine, atorvastatin, cetirizine, clopidogrel, lidocaine, nitroglycerin, quinapril, vitamin B-12, and vitamin D3    Allergies:  Allergies   Allergen Reactions   • Penicillins Shortness Of Breath   • Diclofenac-Misoprostol Unknown - High Severity   • Naproxen Rash       Review of Systems   All systems were reviewed and negative except for: Chest pain    Objective   Objective     Vitals:   Temp:  [98 °F (36.7 °C)] 98 °F (36.7 °C)  Heart Rate:  [48-60] 49  Resp:  [16] 16  BP: (171)/(71) 171/71    Physical Exam    Constitutional: Awake, alert, no acute distress   Eyes: Pupils equal, sclerae anicteric, no conjunctival injection   HENT: NCAT, mucous membranes moist   Neck: Supple, no thyromegaly, no lymphadenopathy, trachea midline   Respiratory: Clear to auscultation bilaterally, nonlabored respirations    Cardiovascular: RRR, no murmurs, rubs, or gallops, palpable pedal pulses bilaterally   Gastrointestinal: Positive bowel sounds, soft, nontender, nondistended   Musculoskeletal: No bilateral ankle edema, no clubbing or cyanosis to extremities   Psychiatric: Appropriate affect, cooperative   Neurologic: Oriented x 3, strength symmetric in all extremities, Cranial Nerves grossly intact to confrontation, speech clear   Skin: No rashes     Result Review    Result Review:  I have personally reviewed the results from  the time of this admission to 11/9/2022 00:06 EST and agree with these findings:  [x]  Laboratory  []  Microbiology  [x]  Radiology  []  EKG/Telemetry   []  Cardiology/Vascular   []  Pathology  []  Old records  []  Other:      Assessment & Plan   Assessment / Plan     Assessment/Plan:   • Non-STEMI: Received aspirin and therapeutic Lovenox in the emergency department, will continue to trend troponin monitor on telemetry.  Supportive care and pain control.  Keep n.p.o. for now.  Consult cardiology appreciate recommendations going forward.  Check echocardiogram.  • Hypertension: Resume home medications as appropriate  • History of CVA: Continue home regiment once verified      DVT prophylaxis:  Medical DVT prophylaxis orders are present.    CODE STATUS:    Code Status (Patient has no pulse and is not breathing): CPR (Attempt to Resuscitate)  Medical Interventions (Patient has pulse or is breathing): Full Support      Admission Status:  I believe this patient meets inpatient status.    Electronically signed by Papo Kelly Jr, MD, 11/09/22, 12:06 AM EST.

## 2022-11-09 NOTE — TELEPHONE ENCOUNTER
Received the following message from Dr Escalera: Can we give patient a call to set up for a follow-up in office either tomorrow or next week he left the hospital AMA.  Thanks      Attempted to call patient multiple time. No answer. No VM option. Will continue to reach patient.

## 2022-11-09 NOTE — NURSING NOTE
Staff walked in the room and pts monitor was laying in the bed with his gown. Security was notified - they called back and said he was walking around the cath lab. Pt returned to his room and smelled heavily of smoke; he admitted to smoking when he was downstairs. He is refusing the heart monitor, refusing to be NPO and is not open to having a conversation about his current health status. He also stated that his wife would be bringing him breakfast in the am. He stated he is not interested in a heart cath or a stress test.

## 2022-11-15 ENCOUNTER — PREP FOR SURGERY (OUTPATIENT)
Dept: OTHER | Facility: HOSPITAL | Age: 67
End: 2022-11-15

## 2022-11-15 ENCOUNTER — OFFICE VISIT (OUTPATIENT)
Dept: CARDIOLOGY | Facility: CLINIC | Age: 67
End: 2022-11-15

## 2022-11-15 VITALS
HEART RATE: 51 BPM | DIASTOLIC BLOOD PRESSURE: 71 MMHG | SYSTOLIC BLOOD PRESSURE: 169 MMHG | BODY MASS INDEX: 30.75 KG/M2 | HEIGHT: 69 IN | WEIGHT: 207.6 LBS | OXYGEN SATURATION: 98 %

## 2022-11-15 DIAGNOSIS — I21.4 NON-STEMI (NON-ST ELEVATED MYOCARDIAL INFARCTION): Primary | ICD-10-CM

## 2022-11-15 DIAGNOSIS — I21.4 NSTEMI, INITIAL EPISODE OF CARE: Primary | ICD-10-CM

## 2022-11-15 DIAGNOSIS — E78.2 MIXED HYPERLIPIDEMIA: ICD-10-CM

## 2022-11-15 DIAGNOSIS — I10 ESSENTIAL HYPERTENSION: ICD-10-CM

## 2022-11-15 PROCEDURE — 99214 OFFICE O/P EST MOD 30 MIN: CPT | Performed by: INTERNAL MEDICINE

## 2022-11-15 RX ORDER — AMLODIPINE BESYLATE 5 MG/1
5 TABLET ORAL DAILY
Qty: 90 TABLET | Refills: 3 | Status: SHIPPED | OUTPATIENT
Start: 2022-11-15
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
## 2022-11-15 NOTE — ASSESSMENT & PLAN NOTE
Patient following back up today after the hospital his chest pain symptoms have improved he has not had any recurrent episodes since his hospital admission.  He has not taken aspirin 81 and Brilinta 90 twice daily dosing.  Recommended proceeding with heart catheterization discussion respites alternatives patient was agreeable

## 2022-11-15 NOTE — ASSESSMENT & PLAN NOTE
Patient's blood pressure is mildly elevated today may be somewhat whitecoat related recommend titrate up the Norvasc to 5 mg a day would encourage patient keep a blood pressure log as well as low-sodium diet of less than 2 g/day
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
## 2022-11-21 ENCOUNTER — HOSPITAL ENCOUNTER (OUTPATIENT)
Facility: HOSPITAL | Age: 67
Setting detail: HOSPITAL OUTPATIENT SURGERY
Discharge: HOME | End: 2022-11-21
Attending: INTERNAL MEDICINE | Admitting: INTERNAL MEDICINE

## 2022-11-21 VITALS
DIASTOLIC BLOOD PRESSURE: 71 MMHG | HEART RATE: 54 BPM | OXYGEN SATURATION: 97 % | HEIGHT: 69 IN | RESPIRATION RATE: 18 BRPM | WEIGHT: 207 LBS | TEMPERATURE: 97.8 F | SYSTOLIC BLOOD PRESSURE: 156 MMHG | BODY MASS INDEX: 30.66 KG/M2

## 2022-11-21 DIAGNOSIS — I21.4 NSTEMI, INITIAL EPISODE OF CARE: ICD-10-CM

## 2022-11-21 PROCEDURE — G0463 HOSPITAL OUTPT CLINIC VISIT: HCPCS

## 2022-11-21 NOTE — NURSING NOTE
"Patient's spouse presented to nursing station at 0844 stating patient has decided he is not going to stay for procedure, \"he wants to smoke\".  "

## 2022-12-05 RX ORDER — CLOPIDOGREL BISULFATE 75 MG/1
TABLET ORAL
Qty: 90 TABLET | Refills: 1 | Status: SHIPPED | OUTPATIENT
Start: 2022-12-05

## 2023-01-03 DIAGNOSIS — E78.2 MIXED HYPERLIPIDEMIA: ICD-10-CM

## 2023-01-04 RX ORDER — ATORVASTATIN CALCIUM 20 MG/1
20 TABLET, FILM COATED ORAL NIGHTLY
Qty: 90 TABLET | Refills: 1 | Status: SHIPPED | OUTPATIENT
Start: 2023-01-04

## 2023-01-24 ENCOUNTER — HOSPITAL ENCOUNTER (OUTPATIENT)
Facility: HOSPITAL | Age: 68
Setting detail: HOSPITAL OUTPATIENT SURGERY
Discharge: HOME | End: 2023-01-24
Attending: INTERNAL MEDICINE | Admitting: INTERNAL MEDICINE
Payer: MEDICARE

## 2023-01-24 VITALS
RESPIRATION RATE: 16 BRPM | DIASTOLIC BLOOD PRESSURE: 78 MMHG | HEIGHT: 69 IN | HEART RATE: 49 BPM | WEIGHT: 207 LBS | OXYGEN SATURATION: 94 % | BODY MASS INDEX: 30.66 KG/M2 | SYSTOLIC BLOOD PRESSURE: 145 MMHG | TEMPERATURE: 97.7 F

## 2023-01-24 DIAGNOSIS — I21.4 NSTEMI, INITIAL EPISODE OF CARE: ICD-10-CM

## 2023-01-24 LAB
ANION GAP SERPL CALCULATED.3IONS-SCNC: 10.1 MMOL/L (ref 5–15)
APTT PPP: 28.6 SECONDS (ref 24.2–34.2)
BUN SERPL-MCNC: 24 MG/DL (ref 8–23)
BUN/CREAT SERPL: 22.9 (ref 7–25)
CALCIUM SPEC-SCNC: 9.1 MG/DL (ref 8.6–10.5)
CHLORIDE SERPL-SCNC: 106 MMOL/L (ref 98–107)
CO2 SERPL-SCNC: 22.9 MMOL/L (ref 22–29)
CREAT SERPL-MCNC: 1.05 MG/DL (ref 0.76–1.27)
DEPRECATED RDW RBC AUTO: 49.1 FL (ref 37–54)
EGFRCR SERPLBLD CKD-EPI 2021: 77.3 ML/MIN/1.73
ERYTHROCYTE [DISTWIDTH] IN BLOOD BY AUTOMATED COUNT: 13.4 % (ref 12.3–15.4)
GLUCOSE SERPL-MCNC: 111 MG/DL (ref 65–99)
HCT VFR BLD AUTO: 43.8 % (ref 37.5–51)
HGB BLD-MCNC: 15 G/DL (ref 13–17.7)
INR PPP: 1 (ref 0.86–1.15)
MCH RBC QN AUTO: 34 PG (ref 26.6–33)
MCHC RBC AUTO-ENTMCNC: 34.2 G/DL (ref 31.5–35.7)
MCV RBC AUTO: 99.3 FL (ref 79–97)
PLATELET # BLD AUTO: 224 10*3/MM3 (ref 140–450)
PMV BLD AUTO: 9.9 FL (ref 6–12)
POTASSIUM SERPL-SCNC: 4.3 MMOL/L (ref 3.5–5.2)
PROTHROMBIN TIME: 13.3 SECONDS (ref 11.8–14.9)
RBC # BLD AUTO: 4.41 10*6/MM3 (ref 4.14–5.8)
SODIUM SERPL-SCNC: 139 MMOL/L (ref 136–145)
WBC NRBC COR # BLD: 9.1 10*3/MM3 (ref 3.4–10.8)

## 2023-01-24 PROCEDURE — 0 IOPAMIDOL PER 1 ML: Performed by: INTERNAL MEDICINE

## 2023-01-24 PROCEDURE — 93458 L HRT ARTERY/VENTRICLE ANGIO: CPT | Performed by: INTERNAL MEDICINE

## 2023-01-24 PROCEDURE — 25010000002 HEPARIN (PORCINE) PER 1000 UNITS: Performed by: INTERNAL MEDICINE

## 2023-01-24 PROCEDURE — 25010000002 MIDAZOLAM PER 1 MG: Performed by: INTERNAL MEDICINE

## 2023-01-24 PROCEDURE — 85027 COMPLETE CBC AUTOMATED: CPT | Performed by: INTERNAL MEDICINE

## 2023-01-24 PROCEDURE — 99152 MOD SED SAME PHYS/QHP 5/>YRS: CPT | Performed by: INTERNAL MEDICINE

## 2023-01-24 PROCEDURE — C1894 INTRO/SHEATH, NON-LASER: HCPCS | Performed by: INTERNAL MEDICINE

## 2023-01-24 PROCEDURE — 85730 THROMBOPLASTIN TIME PARTIAL: CPT | Performed by: INTERNAL MEDICINE

## 2023-01-24 PROCEDURE — 25010000002 FENTANYL CITRATE (PF) 50 MCG/ML SOLUTION: Performed by: INTERNAL MEDICINE

## 2023-01-24 PROCEDURE — 80048 BASIC METABOLIC PNL TOTAL CA: CPT | Performed by: INTERNAL MEDICINE

## 2023-01-24 PROCEDURE — C1769 GUIDE WIRE: HCPCS | Performed by: INTERNAL MEDICINE

## 2023-01-24 PROCEDURE — 85610 PROTHROMBIN TIME: CPT | Performed by: INTERNAL MEDICINE

## 2023-01-24 RX ORDER — ACETAMINOPHEN 325 MG/1
650 TABLET ORAL EVERY 4 HOURS PRN
Status: DISCONTINUED | OUTPATIENT
Start: 2023-01-24 | End: 2023-01-24 | Stop reason: HOSPADM

## 2023-01-24 RX ORDER — SODIUM CHLORIDE 9 MG/ML
100 INJECTION, SOLUTION INTRAVENOUS CONTINUOUS
Status: DISCONTINUED | OUTPATIENT
Start: 2023-01-24 | End: 2023-01-24 | Stop reason: HOSPADM

## 2023-01-24 RX ORDER — SODIUM CHLORIDE 0.9 % (FLUSH) 0.9 %
10 SYRINGE (ML) INJECTION AS NEEDED
Status: DISCONTINUED | OUTPATIENT
Start: 2023-01-24 | End: 2023-01-24 | Stop reason: HOSPADM

## 2023-01-24 RX ORDER — NALOXONE HCL 0.4 MG/ML
0.4 VIAL (ML) INJECTION
Status: DISCONTINUED | OUTPATIENT
Start: 2023-01-24 | End: 2023-01-24 | Stop reason: HOSPADM

## 2023-01-24 RX ORDER — FENTANYL CITRATE 50 UG/ML
INJECTION, SOLUTION INTRAMUSCULAR; INTRAVENOUS
Status: DISCONTINUED | OUTPATIENT
Start: 2023-01-24 | End: 2023-01-24 | Stop reason: HOSPADM

## 2023-01-24 RX ORDER — MORPHINE SULFATE 2 MG/ML
1 INJECTION, SOLUTION INTRAMUSCULAR; INTRAVENOUS EVERY 4 HOURS PRN
Status: DISCONTINUED | OUTPATIENT
Start: 2023-01-24 | End: 2023-01-24 | Stop reason: HOSPADM

## 2023-01-24 RX ORDER — MIDAZOLAM HYDROCHLORIDE 1 MG/ML
INJECTION INTRAMUSCULAR; INTRAVENOUS
Status: DISCONTINUED | OUTPATIENT
Start: 2023-01-24 | End: 2023-01-24 | Stop reason: HOSPADM

## 2023-01-24 RX ORDER — ONDANSETRON 2 MG/ML
4 INJECTION INTRAMUSCULAR; INTRAVENOUS EVERY 6 HOURS PRN
Status: DISCONTINUED | OUTPATIENT
Start: 2023-01-24 | End: 2023-01-24 | Stop reason: HOSPADM

## 2023-01-24 RX ORDER — HEPARIN SODIUM 1000 [USP'U]/ML
INJECTION, SOLUTION INTRAVENOUS; SUBCUTANEOUS
Status: DISCONTINUED | OUTPATIENT
Start: 2023-01-24 | End: 2023-01-24 | Stop reason: HOSPADM

## 2023-01-24 RX ORDER — DULOXETIN HYDROCHLORIDE 60 MG/1
60 CAPSULE, DELAYED RELEASE ORAL 2 TIMES DAILY
Qty: 90 CAPSULE | Refills: 1 | Status: SHIPPED | OUTPATIENT
Start: 2023-01-24 | End: 2023-03-20

## 2023-01-24 RX ORDER — LIDOCAINE HYDROCHLORIDE 20 MG/ML
INJECTION, SOLUTION INFILTRATION; PERINEURAL
Status: DISCONTINUED | OUTPATIENT
Start: 2023-01-24 | End: 2023-01-24 | Stop reason: HOSPADM

## 2023-01-24 RX ORDER — ONDANSETRON 4 MG/1
4 TABLET, FILM COATED ORAL EVERY 6 HOURS PRN
Status: DISCONTINUED | OUTPATIENT
Start: 2023-01-24 | End: 2023-01-24 | Stop reason: HOSPADM

## 2023-01-24 NOTE — TELEPHONE ENCOUNTER
Caller: JIMSLIME    Relationship: Emergency Contact    Best call back number: [phone]    Requested Prescriptions:   Requested Prescriptions     Pending Prescriptions Disp Refills   • DULoxetine (CYMBALTA) 60 MG capsule 90 capsule 1     Sig: Take 1 capsule by mouth 2 (Two) Times a Day.        Pharmacy where request should be sent: [id number] [place] [facility] [address] [facility] [phone] [facility] [phone] FX     Does the patient have less than a 3 day supply:  [] Yes  [x] No    Bobby Royal Rep   01/24/23 14:53 EST

## 2023-02-02 ENCOUNTER — LAB (OUTPATIENT)
Dept: LAB | Facility: HOSPITAL | Age: 68
End: 2023-02-02
Payer: MEDICARE

## 2023-02-02 DIAGNOSIS — I65.23 CAROTID STENOSIS, ASYMPTOMATIC, BILATERAL: ICD-10-CM

## 2023-02-02 DIAGNOSIS — E55.9 VITAMIN D DEFICIENCY: ICD-10-CM

## 2023-02-02 DIAGNOSIS — Z12.5 SCREENING PSA (PROSTATE SPECIFIC ANTIGEN): ICD-10-CM

## 2023-02-02 DIAGNOSIS — Z86.73 H/O: CVA (CEREBROVASCULAR ACCIDENT): ICD-10-CM

## 2023-02-02 DIAGNOSIS — E11.9 TYPE 2 DIABETES MELLITUS WITHOUT COMPLICATION, WITHOUT LONG-TERM CURRENT USE OF INSULIN: ICD-10-CM

## 2023-02-02 DIAGNOSIS — I10 ESSENTIAL HYPERTENSION: ICD-10-CM

## 2023-02-02 DIAGNOSIS — I65.21 STENOSIS OF RIGHT CAROTID ARTERY: ICD-10-CM

## 2023-02-02 DIAGNOSIS — E29.1 HYPOGONADISM IN MALE: ICD-10-CM

## 2023-02-02 DIAGNOSIS — E78.2 MIXED HYPERLIPIDEMIA: ICD-10-CM

## 2023-02-02 LAB
ALBUMIN SERPL-MCNC: 4.2 G/DL (ref 3.5–5.2)
ALBUMIN/GLOB SERPL: 1.4 G/DL
ALP SERPL-CCNC: 63 U/L (ref 39–117)
ALT SERPL W P-5'-P-CCNC: 17 U/L (ref 1–41)
ANION GAP SERPL CALCULATED.3IONS-SCNC: 8.8 MMOL/L (ref 5–15)
AST SERPL-CCNC: 18 U/L (ref 1–40)
BASOPHILS # BLD AUTO: 0.05 10*3/MM3 (ref 0–0.2)
BASOPHILS NFR BLD AUTO: 0.7 % (ref 0–1.5)
BILIRUB SERPL-MCNC: 0.5 MG/DL (ref 0–1.2)
BUN SERPL-MCNC: 18 MG/DL (ref 8–23)
BUN/CREAT SERPL: 15.8 (ref 7–25)
CALCIUM SPEC-SCNC: 8.9 MG/DL (ref 8.6–10.5)
CHLORIDE SERPL-SCNC: 105 MMOL/L (ref 98–107)
CHOLEST SERPL-MCNC: 147 MG/DL (ref 0–200)
CO2 SERPL-SCNC: 26.2 MMOL/L (ref 22–29)
CREAT SERPL-MCNC: 1.14 MG/DL (ref 0.76–1.27)
DEPRECATED RDW RBC AUTO: 47.3 FL (ref 37–54)
EGFRCR SERPLBLD CKD-EPI 2021: 70.1 ML/MIN/1.73
EOSINOPHIL # BLD AUTO: 0.42 10*3/MM3 (ref 0–0.4)
EOSINOPHIL NFR BLD AUTO: 5.8 % (ref 0.3–6.2)
ERYTHROCYTE [DISTWIDTH] IN BLOOD BY AUTOMATED COUNT: 12.9 % (ref 12.3–15.4)
GLOBULIN UR ELPH-MCNC: 2.9 GM/DL
GLUCOSE SERPL-MCNC: 101 MG/DL (ref 65–99)
HBA1C MFR BLD: 5.8 % (ref 4.8–5.6)
HCT VFR BLD AUTO: 46.2 % (ref 37.5–51)
HDLC SERPL-MCNC: 39 MG/DL (ref 40–60)
HGB BLD-MCNC: 15.6 G/DL (ref 13–17.7)
IMM GRANULOCYTES # BLD AUTO: 0.03 10*3/MM3 (ref 0–0.05)
IMM GRANULOCYTES NFR BLD AUTO: 0.4 % (ref 0–0.5)
LDLC SERPL CALC-MCNC: 78 MG/DL (ref 0–100)
LDLC/HDLC SERPL: 1.88 {RATIO}
LYMPHOCYTES # BLD AUTO: 1.79 10*3/MM3 (ref 0.7–3.1)
LYMPHOCYTES NFR BLD AUTO: 24.6 % (ref 19.6–45.3)
MCH RBC QN AUTO: 33.7 PG (ref 26.6–33)
MCHC RBC AUTO-ENTMCNC: 33.8 G/DL (ref 31.5–35.7)
MCV RBC AUTO: 99.8 FL (ref 79–97)
MONOCYTES # BLD AUTO: 0.48 10*3/MM3 (ref 0.1–0.9)
MONOCYTES NFR BLD AUTO: 6.6 % (ref 5–12)
NEUTROPHILS NFR BLD AUTO: 4.51 10*3/MM3 (ref 1.7–7)
NEUTROPHILS NFR BLD AUTO: 61.9 % (ref 42.7–76)
NRBC BLD AUTO-RTO: 0 /100 WBC (ref 0–0.2)
PLATELET # BLD AUTO: 242 10*3/MM3 (ref 140–450)
PMV BLD AUTO: 10.4 FL (ref 6–12)
POTASSIUM SERPL-SCNC: 4.5 MMOL/L (ref 3.5–5.2)
PROT SERPL-MCNC: 7.1 G/DL (ref 6–8.5)
RBC # BLD AUTO: 4.63 10*6/MM3 (ref 4.14–5.8)
SODIUM SERPL-SCNC: 140 MMOL/L (ref 136–145)
TRIGL SERPL-MCNC: 174 MG/DL (ref 0–150)
VLDLC SERPL-MCNC: 30 MG/DL (ref 5–40)
WBC NRBC COR # BLD: 7.28 10*3/MM3 (ref 3.4–10.8)

## 2023-02-02 PROCEDURE — 83036 HEMOGLOBIN GLYCOSYLATED A1C: CPT

## 2023-02-02 PROCEDURE — 80061 LIPID PANEL: CPT

## 2023-02-02 PROCEDURE — 36415 COLL VENOUS BLD VENIPUNCTURE: CPT

## 2023-02-02 PROCEDURE — 85025 COMPLETE CBC W/AUTO DIFF WBC: CPT

## 2023-02-02 PROCEDURE — 80053 COMPREHEN METABOLIC PANEL: CPT

## 2023-02-07 ENCOUNTER — OFFICE VISIT (OUTPATIENT)
Dept: INTERNAL MEDICINE | Facility: CLINIC | Age: 68
End: 2023-02-07
Payer: MEDICARE

## 2023-02-07 VITALS
DIASTOLIC BLOOD PRESSURE: 89 MMHG | SYSTOLIC BLOOD PRESSURE: 151 MMHG | OXYGEN SATURATION: 94 % | HEIGHT: 69 IN | TEMPERATURE: 98.4 F | BODY MASS INDEX: 30.66 KG/M2 | HEART RATE: 59 BPM | WEIGHT: 207 LBS

## 2023-02-07 DIAGNOSIS — E29.1 HYPOGONADISM IN MALE: ICD-10-CM

## 2023-02-07 DIAGNOSIS — E78.2 MIXED HYPERLIPIDEMIA: ICD-10-CM

## 2023-02-07 DIAGNOSIS — R07.89 CHEST DISCOMFORT: ICD-10-CM

## 2023-02-07 DIAGNOSIS — I21.4 NON-STEMI (NON-ST ELEVATED MYOCARDIAL INFARCTION): ICD-10-CM

## 2023-02-07 DIAGNOSIS — I10 ESSENTIAL HYPERTENSION: ICD-10-CM

## 2023-02-07 DIAGNOSIS — E11.9 TYPE 2 DIABETES MELLITUS WITHOUT COMPLICATION, WITHOUT LONG-TERM CURRENT USE OF INSULIN: Primary | ICD-10-CM

## 2023-02-07 DIAGNOSIS — Z86.73 H/O: CVA (CEREBROVASCULAR ACCIDENT): ICD-10-CM

## 2023-02-07 DIAGNOSIS — Z12.5 SCREENING PSA (PROSTATE SPECIFIC ANTIGEN): ICD-10-CM

## 2023-02-07 DIAGNOSIS — I65.21 STENOSIS OF RIGHT CAROTID ARTERY: ICD-10-CM

## 2023-02-07 DIAGNOSIS — E55.9 VITAMIN D DEFICIENCY: ICD-10-CM

## 2023-02-07 PROCEDURE — 99214 OFFICE O/P EST MOD 30 MIN: CPT | Performed by: INTERNAL MEDICINE

## 2023-02-07 NOTE — PROGRESS NOTES
"CHIEF COMPLAINT:  Diabetes and Follow-up      HPI: Follow-up with his blood sugars, recent emergency room visit in November and recent cardiac cath by Dr. Escalera, January 24, 2023  He had a non-ST elevation myocardial infarction, November 8 order a couple days prior 2022, troponin T 0.39 and 0.55,      Objective   Vital Signs  Vitals:    02/07/23 1407   BP: 151/89   Pulse: 59   Temp: 98.4 °F (36.9 °C)   SpO2: 94%   Weight: 93.9 kg (207 lb)   Height: 175.3 cm (69.02\")      Body mass index is 30.55 kg/m².  Review of Systems   Constitutional: Negative.    HENT: Negative.    Eyes: Negative.    Respiratory: Negative.    Cardiovascular: Positive for chest pain.   Gastrointestinal: Negative.    Endocrine: Negative.    Genitourinary: Negative.    Musculoskeletal: Negative.    Allergic/Immunologic: Negative.    Neurological: Negative.    Hematological: Negative.    Psychiatric/Behavioral: Negative.       Physical Exam  Constitutional:       General: He is not in acute distress.     Appearance: Normal appearance.   HENT:      Head: Normocephalic.      Mouth/Throat:      Mouth: Mucous membranes are moist.   Eyes:      Conjunctiva/sclera: Conjunctivae normal.      Pupils: Pupils are equal, round, and reactive to light.   Cardiovascular:      Rate and Rhythm: Normal rate and regular rhythm.      Pulses: Normal pulses.      Heart sounds: Normal heart sounds.   Pulmonary:      Effort: Pulmonary effort is normal.      Breath sounds: Normal breath sounds.   Abdominal:      General: Abdomen is flat. Bowel sounds are normal.      Palpations: Abdomen is soft.   Musculoskeletal:         General: No swelling. Normal range of motion.      Cervical back: Neck supple.   Skin:     General: Skin is warm and dry.      Coloration: Skin is not jaundiced.   Neurological:      General: No focal deficit present.      Mental Status: He is alert and oriented to person, place, and time. Mental status is at baseline.   Psychiatric:         Mood and " Affect: Mood normal.         Behavior: Behavior normal.         Thought Content: Thought content normal.         Judgment: Judgment normal.        Result Review :   Lab Results   Component Value Date    PROBNP 2,625.0 (H) 11/08/2022     CMP    CMP 11/9/22 1/24/23 2/2/23   Glucose 111 (A) 111 (A) 101 (A)   BUN 16 24 (A) 18   Creatinine 0.96 1.05 1.14   eGFR 86.6 77.3 70.1   Sodium 139 139 140   Potassium 4.4 4.3 4.5   Chloride 103 106 105   Calcium 9.4 9.1 8.9   Total Protein 7.1  7.1   Albumin 4.20  4.2   Globulin 2.9  2.9   Total Bilirubin 0.4  0.5   Alkaline Phosphatase 74  63   AST (SGOT) 30  18   ALT (SGPT) 24  17   Albumin/Globulin Ratio 1.4  1.4   BUN/Creatinine Ratio 16.7 22.9 15.8   Anion Gap 11.9 10.1 8.8   (A) Abnormal value       Comments are available for some flowsheets but are not being displayed.           CBC w/diff    CBC w/Diff 11/9/22 1/24/23 2/2/23   WBC 8.49 9.10 7.28   RBC 4.47 4.41 4.63   Hemoglobin 15.8 15.0 15.6   Hematocrit 44.0 43.8 46.2   MCV 98.4 (A) 99.3 (A) 99.8 (A)   MCH 35.3 (A) 34.0 (A) 33.7 (A)   MCHC 35.9 (A) 34.2 33.8   RDW 12.6 13.4 12.9   Platelets 193 224 242   Neutrophil Rel % 57.9  61.9   Immature Granulocyte Rel % 0.2  0.4   Lymphocyte Rel % 28.5  24.6   Monocyte Rel % 8.0  6.6   Eosinophil Rel % 4.8  5.8   Basophil Rel % 0.6  0.7   (A) Abnormal value             Lipid Panel    Lipid Panel 7/29/22 11/9/22 2/2/23   Total Cholesterol 124 130 147   Triglycerides 145 182 (A) 174 (A)   HDL Cholesterol 37 (A) 42 39 (A)   VLDL Cholesterol 25 30 30   LDL Cholesterol  62 58 78   LDL/HDL Ratio 1.57 1.23 1.88   (A) Abnormal value             Lab Results   Component Value Date    TSH 2.410 07/22/2021    TSH 1.810 07/14/2020      No results found for: FREET4   A1C Last 3 Results    HGBA1C Last 3 Results 7/29/22 2/2/23   Hemoglobin A1C 5.80 (A) 5.80 (A)   (A) Abnormal value             PSA    PSA 7/29/22   PSA 2.190                          Visit Diagnoses:    ICD-10-CM ICD-9-CM   1.  Type 2 diabetes mellitus without complication, without long-term current use of insulin ([facility])  E11.9 250.00   2. Hypogonadism in male  E29.1 257.2   3. H/O: CVA (cerebrovascular accident)  Z86.73 V12.54   4. Screening PSA (prostate specific antigen)  Z12.5 V76.44   5. Vitamin D deficiency  E55.9 268.9   6. Mixed hyperlipidemia  E78.2 272.2   7. Essential hypertension  I10 401.9   8. Stenosis of right carotid artery  I65.21 433.10   9. Non-STEMI (non-ST elevated myocardial infarction) ([facility])  I21.4 410.70   10. Chest discomfort  R07.89 786.59       Assessment and Plan   Diagnoses and all orders for this visit:    1. Type 2 diabetes mellitus without complication, without long-term current use of insulin ([facility]) (Primary)  -     CBC & Differential; Future  -     Comprehensive Metabolic Panel; Future  -     Hemoglobin A1c; Future  -     Lipid Panel; Future  -     PSA Screen; Future    2. Hypogonadism in male  -     CBC & Differential; Future  -     Comprehensive Metabolic Panel; Future  -     Hemoglobin A1c; Future  -     Lipid Panel; Future  -     PSA Screen; Future    3. H/O: CVA (cerebrovascular accident)  -     CBC & Differential; Future  -     Comprehensive Metabolic Panel; Future  -     Hemoglobin A1c; Future  -     Lipid Panel; Future  -     PSA Screen; Future    4. Screening PSA (prostate specific antigen)  -     CBC & Differential; Future  -     Comprehensive Metabolic Panel; Future  -     Hemoglobin A1c; Future  -     Lipid Panel; Future  -     PSA Screen; Future    5. Vitamin D deficiency  -     CBC & Differential; Future  -     Comprehensive Metabolic Panel; Future  -     Hemoglobin A1c; Future  -     Lipid Panel; Future  -     PSA Screen; Future    6. Mixed hyperlipidemia  -     CBC & Differential; Future  -     Comprehensive Metabolic Panel; Future  -     Hemoglobin A1c; Future  -     Lipid Panel; Future  -     PSA Screen; Future    7. Essential hypertension  -     CBC & Differential; Future  -      Comprehensive Metabolic Panel; Future  -     Hemoglobin A1c; Future  -     Lipid Panel; Future  -     PSA Screen; Future    8. Stenosis of right carotid artery  -     CBC & Differential; Future  -     Comprehensive Metabolic Panel; Future  -     Hemoglobin A1c; Future  -     Lipid Panel; Future  -     PSA Screen; Future    9. Non-STEMI (non-ST elevated myocardial infarction) (HCC)  -     CBC & Differential; Future  -     Comprehensive Metabolic Panel; Future  -     Hemoglobin A1c; Future  -     Lipid Panel; Future  -     PSA Screen; Future    10. Chest discomfort  -     CBC & Differential; Future  -     Comprehensive Metabolic Panel; Future  -     Hemoglobin A1c; Future  -     Lipid Panel; Future  -     PSA Screen; Future    Chest pressure chest tightness questionable, November during a stressful event, ended up going to the emergency room, November, 2022, was referred to cardiology, stress test recently done January 24, 2023,, left heart cath showed minimal coronary artery disease normal ejection fraction 55%, mild luminal regularities, chest x-ray November 2022 no active disease,    Hemorrhoids, will refer to Dr. Randhawa general surgery, August 3, 2022    Mild vitamin D3, B12, folate deficiencies continues ot counter supplements    Impaired fasting glucose,/ dm 2 --- hemoglobin A1c 5.8 February 2, 2023    Carotid stenosis right artery--80-99, -% feb 2018, - S/P R CEA surgery August 29, 2019 in [place] ----cardiac stress testing August 21 in [place] and echocardiogram------dr loren lowery at [facility]--   Cont plavix ---, Follow-up CAROTIDS jUNE 2020 WITH 50% STENOSIS, right-sided stable July 2020, Patient continues to follow-up with vascular surgery, --continues yearly follow-up now as of July 2022    Heart murmur, questionable previous echo August 2019 showed normal ejection fraction, no significant valve abnormalities reported, consider repeating test at some point    Low testosterone Low again 143, July  2021, -- PSA at 2.1 July 2022     CAD, REMOTE MI--    Mixed hyperlipidemia, --CHOL GOOD, -continue-lipitor 20 mg qd , Plavix continues    OLD STROKEs- 2004-MILD COG IMPAIRMENT, L UE WEAKNESS,? VERY MILD INTERMITTENT DYSPHAGIA    LUMBAR- CERVICAL DJD-- PRIOR LUMBAR DISC SURGERY    C SCOPE 12/15, DUSTY, 2 X POLYPS    Ultrasound aorta -- vascular surgery clinic in [place]----- NO significant aneurysm August 2019,    DISCUSSED LOW DOSE CT SCREENING FOR LUNG CA SCREENING feb 2022      Follow Up   Return in about 6 months (around 8/7/2023).  Patient was given instructions and counseling regarding his condition or for health maintenance advice. Please see specific information pulled into the AVS if appropriate.

## 2023-02-22 RX ORDER — QUINAPRIL 40 MG/1
40 TABLET ORAL DAILY
Qty: 90 TABLET | Refills: 1 | Status: SHIPPED | OUTPATIENT
Start: 2023-02-22

## 2023-02-22 NOTE — TELEPHONE ENCOUNTER
DELETE AFTER REVIEWING: Send the encounter HIGH priority, If patient has less than a 3 day supply. If the patient will run out of medication over the weekend add that information to the additional details line. Send this encounter to the clinical pool.    Caller: SLIME FERNANDEZ    Relationship: Emergency Contact    Best call back number:    [phone]          Requested Prescriptions:   Requested Prescriptions     Pending Prescriptions Disp Refills   • quinapril (ACCUPRIL) 40 MG tablet 90 tablet 1     Sig: Take 1 tablet by mouth Daily.        Pharmacy where request should be sent: [id number] - [facility] [place] - [address] [facility] [phone] [facility] [phone] FX       Does the patient have less than a 3 day supply:  [] Yes  [x] No    Would you like a call back once the refill request has been completed: [x] Yes [] No    If the office needs to give you a call back, can they leave a voicemail: [x] Yes [] No    Bobby Hamilton Rep   02/22/23 15:02 EST

## 2023-02-27 ENCOUNTER — TELEPHONE (OUTPATIENT)
Dept: INTERNAL MEDICINE | Facility: CLINIC | Age: 68
End: 2023-02-27

## 2023-02-27 RX ORDER — LISINOPRIL 40 MG/1
40 TABLET ORAL DAILY
Qty: 90 TABLET | Refills: 3 | Status: SHIPPED | OUTPATIENT
Start: 2023-02-27

## 2023-02-27 NOTE — TELEPHONE ENCOUNTER
Tell patient I sent in another medication that similar to take every day and have already sent that in

## 2023-02-27 NOTE — TELEPHONE ENCOUNTER
Caller: SLIME FERNANDEZ    Relationship: Emergency Contact    Best call back number: [phone]     What medication are you requesting: REPLACEMENT FOR QUINAPRIL    What are your current symptoms: HIGH BLOOD PRESSURE    How long have you been experiencing symptoms: SEVERAL YEARS    Have you had these symptoms before:    [x] Yes  [] No    Have you been treated for these symptoms before:   [x] Yes  [] No    If a prescription is needed, what is your preferred pharmacy and phone number: [id number] - [facility] [place] - [address] AT [facility] [phone] [facility] [phone]      Additional notes:  PATIENT'S WIFE STATES THAT THEY ARE NO LONGER ABLE TO GET QUINAPRIL. SHE WOULD LIKE DR GONSALVES TO WRITE A PRESCRIPTION FOR SOMETHING SIMILAR.    SHE ALSO STATES THAT THE PATIENT USUALLY TAKES HIS QUINAPRIL EVERY OTHER DAY INSTEAD OF EVERY DAY.

## 2023-03-20 RX ORDER — DULOXETIN HYDROCHLORIDE 60 MG/1
CAPSULE, DELAYED RELEASE ORAL
Qty: 90 CAPSULE | Refills: 1 | Status: SHIPPED | OUTPATIENT
Start: 2023-03-20

## 2023-04-26 DIAGNOSIS — E78.2 MIXED HYPERLIPIDEMIA: ICD-10-CM

## 2023-04-26 RX ORDER — ATORVASTATIN CALCIUM 20 MG/1
20 TABLET, FILM COATED ORAL NIGHTLY
Qty: 90 TABLET | Refills: 1 | Status: SHIPPED | OUTPATIENT
Start: 2023-04-26

## 2023-06-06 RX ORDER — DULOXETIN HYDROCHLORIDE 60 MG/1
CAPSULE, DELAYED RELEASE ORAL
Qty: 90 CAPSULE | Refills: 1 | Status: SHIPPED | OUTPATIENT
Start: 2023-06-06

## 2023-08-08 ENCOUNTER — LAB (OUTPATIENT)
Dept: LAB | Facility: HOSPITAL | Age: 68
End: 2023-08-08
Payer: MEDICARE

## 2023-08-08 DIAGNOSIS — Z12.5 SCREENING PSA (PROSTATE SPECIFIC ANTIGEN): ICD-10-CM

## 2023-08-08 DIAGNOSIS — I10 ESSENTIAL HYPERTENSION: ICD-10-CM

## 2023-08-08 DIAGNOSIS — R07.89 CHEST DISCOMFORT: ICD-10-CM

## 2023-08-08 DIAGNOSIS — E29.1 HYPOGONADISM IN MALE: ICD-10-CM

## 2023-08-08 DIAGNOSIS — I21.4 NON-STEMI (NON-ST ELEVATED MYOCARDIAL INFARCTION): ICD-10-CM

## 2023-08-08 DIAGNOSIS — E55.9 VITAMIN D DEFICIENCY: ICD-10-CM

## 2023-08-08 DIAGNOSIS — I65.21 STENOSIS OF RIGHT CAROTID ARTERY: ICD-10-CM

## 2023-08-08 DIAGNOSIS — E78.2 MIXED HYPERLIPIDEMIA: ICD-10-CM

## 2023-08-08 DIAGNOSIS — E11.9 TYPE 2 DIABETES MELLITUS WITHOUT COMPLICATION, WITHOUT LONG-TERM CURRENT USE OF INSULIN: ICD-10-CM

## 2023-08-08 DIAGNOSIS — Z86.73 H/O: CVA (CEREBROVASCULAR ACCIDENT): ICD-10-CM

## 2023-08-08 LAB
ALBUMIN SERPL-MCNC: 4.1 G/DL (ref 3.5–5.2)
ALBUMIN/GLOB SERPL: 1.4 G/DL
ALP SERPL-CCNC: 63 U/L (ref 39–117)
ALT SERPL W P-5'-P-CCNC: 20 U/L (ref 1–41)
ANION GAP SERPL CALCULATED.3IONS-SCNC: 9.3 MMOL/L (ref 5–15)
AST SERPL-CCNC: 19 U/L (ref 1–40)
BASOPHILS # BLD AUTO: 0.07 10*3/MM3 (ref 0–0.2)
BASOPHILS NFR BLD AUTO: 1.1 % (ref 0–1.5)
BILIRUB SERPL-MCNC: 0.4 MG/DL (ref 0–1.2)
BUN SERPL-MCNC: 20 MG/DL (ref 8–23)
BUN/CREAT SERPL: 18.9 (ref 7–25)
CALCIUM SPEC-SCNC: 9.5 MG/DL (ref 8.6–10.5)
CHLORIDE SERPL-SCNC: 105 MMOL/L (ref 98–107)
CHOLEST SERPL-MCNC: 150 MG/DL (ref 0–200)
CO2 SERPL-SCNC: 24.7 MMOL/L (ref 22–29)
CREAT SERPL-MCNC: 1.06 MG/DL (ref 0.76–1.27)
DEPRECATED RDW RBC AUTO: 44.5 FL (ref 37–54)
EGFRCR SERPLBLD CKD-EPI 2021: 76.4 ML/MIN/1.73
EOSINOPHIL # BLD AUTO: 0.47 10*3/MM3 (ref 0–0.4)
EOSINOPHIL NFR BLD AUTO: 7.3 % (ref 0.3–6.2)
ERYTHROCYTE [DISTWIDTH] IN BLOOD BY AUTOMATED COUNT: 12.2 % (ref 12.3–15.4)
GLOBULIN UR ELPH-MCNC: 2.9 GM/DL
GLUCOSE SERPL-MCNC: 102 MG/DL (ref 65–99)
HBA1C MFR BLD: 5.8 % (ref 4.8–5.6)
HCT VFR BLD AUTO: 44.8 % (ref 37.5–51)
HDLC SERPL-MCNC: 42 MG/DL (ref 40–60)
HGB BLD-MCNC: 15.3 G/DL (ref 13–17.7)
IMM GRANULOCYTES # BLD AUTO: 0.02 10*3/MM3 (ref 0–0.05)
IMM GRANULOCYTES NFR BLD AUTO: 0.3 % (ref 0–0.5)
LDLC SERPL CALC-MCNC: 78 MG/DL (ref 0–100)
LDLC/HDLC SERPL: 1.73 {RATIO}
LYMPHOCYTES # BLD AUTO: 1.95 10*3/MM3 (ref 0.7–3.1)
LYMPHOCYTES NFR BLD AUTO: 30.1 % (ref 19.6–45.3)
MCH RBC QN AUTO: 34 PG (ref 26.6–33)
MCHC RBC AUTO-ENTMCNC: 34.2 G/DL (ref 31.5–35.7)
MCV RBC AUTO: 99.6 FL (ref 79–97)
MONOCYTES # BLD AUTO: 0.46 10*3/MM3 (ref 0.1–0.9)
MONOCYTES NFR BLD AUTO: 7.1 % (ref 5–12)
NEUTROPHILS NFR BLD AUTO: 3.51 10*3/MM3 (ref 1.7–7)
NEUTROPHILS NFR BLD AUTO: 54.1 % (ref 42.7–76)
NRBC BLD AUTO-RTO: 0 /100 WBC (ref 0–0.2)
PLATELET # BLD AUTO: 218 10*3/MM3 (ref 140–450)
PMV BLD AUTO: 10.3 FL (ref 6–12)
POTASSIUM SERPL-SCNC: 4.7 MMOL/L (ref 3.5–5.2)
PROT SERPL-MCNC: 7 G/DL (ref 6–8.5)
RBC # BLD AUTO: 4.5 10*6/MM3 (ref 4.14–5.8)
SODIUM SERPL-SCNC: 139 MMOL/L (ref 136–145)
TRIGL SERPL-MCNC: 177 MG/DL (ref 0–150)
VLDLC SERPL-MCNC: 30 MG/DL (ref 5–40)
WBC NRBC COR # BLD: 6.48 10*3/MM3 (ref 3.4–10.8)

## 2023-08-08 PROCEDURE — G0103 PSA SCREENING: HCPCS

## 2023-08-08 PROCEDURE — 80061 LIPID PANEL: CPT

## 2023-08-08 PROCEDURE — 80053 COMPREHEN METABOLIC PANEL: CPT

## 2023-08-08 PROCEDURE — 85025 COMPLETE CBC W/AUTO DIFF WBC: CPT

## 2023-08-08 PROCEDURE — 83036 HEMOGLOBIN GLYCOSYLATED A1C: CPT

## 2023-08-08 PROCEDURE — 36415 COLL VENOUS BLD VENIPUNCTURE: CPT

## 2023-08-09 LAB — PSA SERPL-MCNC: 2.68 NG/ML (ref 0–4)

## 2023-08-14 ENCOUNTER — OFFICE VISIT (OUTPATIENT)
Dept: INTERNAL MEDICINE | Facility: CLINIC | Age: 68
End: 2023-08-14
Payer: MEDICARE

## 2023-08-14 VITALS
WEIGHT: 203.6 LBS | BODY MASS INDEX: 30.16 KG/M2 | HEIGHT: 69 IN | DIASTOLIC BLOOD PRESSURE: 77 MMHG | HEART RATE: 71 BPM | TEMPERATURE: 98.6 F | SYSTOLIC BLOOD PRESSURE: 119 MMHG | OXYGEN SATURATION: 95 %

## 2023-08-14 DIAGNOSIS — I65.23 CAROTID STENOSIS, ASYMPTOMATIC, BILATERAL: ICD-10-CM

## 2023-08-14 DIAGNOSIS — E11.9 TYPE 2 DIABETES MELLITUS WITHOUT COMPLICATION, WITHOUT LONG-TERM CURRENT USE OF INSULIN: ICD-10-CM

## 2023-08-14 DIAGNOSIS — E78.2 MIXED HYPERLIPIDEMIA: ICD-10-CM

## 2023-08-14 DIAGNOSIS — I21.4 NON-STEMI (NON-ST ELEVATED MYOCARDIAL INFARCTION): ICD-10-CM

## 2023-08-14 DIAGNOSIS — Z00.00 MEDICARE ANNUAL WELLNESS VISIT, SUBSEQUENT: Primary | ICD-10-CM

## 2023-08-14 DIAGNOSIS — Z86.73 H/O: CVA (CEREBROVASCULAR ACCIDENT): ICD-10-CM

## 2023-08-14 DIAGNOSIS — I10 ESSENTIAL HYPERTENSION: ICD-10-CM

## 2023-08-14 DIAGNOSIS — E55.9 VITAMIN D DEFICIENCY: ICD-10-CM

## 2023-08-14 DIAGNOSIS — Z12.5 SCREENING PSA (PROSTATE SPECIFIC ANTIGEN): ICD-10-CM

## 2023-08-14 PROCEDURE — 3078F DIAST BP <80 MM HG: CPT | Performed by: INTERNAL MEDICINE

## 2023-08-14 PROCEDURE — G0439 PPPS, SUBSEQ VISIT: HCPCS | Performed by: INTERNAL MEDICINE

## 2023-08-14 PROCEDURE — 3044F HG A1C LEVEL LT 7.0%: CPT | Performed by: INTERNAL MEDICINE

## 2023-08-14 PROCEDURE — 99214 OFFICE O/P EST MOD 30 MIN: CPT | Performed by: INTERNAL MEDICINE

## 2023-08-14 PROCEDURE — 1170F FXNL STATUS ASSESSED: CPT | Performed by: INTERNAL MEDICINE

## 2023-08-14 PROCEDURE — 3074F SYST BP LT 130 MM HG: CPT | Performed by: INTERNAL MEDICINE

## 2023-08-14 RX ORDER — EZETIMIBE 10 MG/1
10 TABLET ORAL DAILY
Qty: 90 TABLET | Refills: 3 | Status: SHIPPED | OUTPATIENT
Start: 2023-08-14

## 2023-08-14 NOTE — PROGRESS NOTES
The ABCs of the Annual Wellness Visit  Subsequent Medicare Wellness Visit    Subjective      Aram Sarmiento is a 67 y.o. male who presents for a Subsequent Medicare Wellness Visit.    The following portions of the patient's history were reviewed and   updated as appropriate: allergies, current medications, past family history, past medical history, past social history, past surgical history, and problem list.    Compared to one year ago, the patient feels his physical   health is the same,    Compared to one year ago, the patient feels his mental   health is the same.    Recent Hospitalizations:  This patient has had a [facility] admission record on file within the last 365 days.    Current Medical Providers:  Patient Care Team:  Rolando Darling MD as PCP - General (Internal Medicine)  Flavio Randhawa MD as Consulting Physician (General Surgery)    Outpatient Medications Prior to Visit   Medication Sig Dispense Refill    atorvastatin (LIPITOR) 20 MG tablet TAKE 1 TABLET BY MOUTH EVERY NIGHT 90 tablet 1    cetirizine (zyrTEC) 10 MG tablet Take 1 tablet by mouth Daily.      clopidogrel (PLAVIX) 75 MG tablet TAKE 1 TABLET BY MOUTH EVERY DAY 90 tablet 1    DULoxetine (CYMBALTA) 60 MG capsule TAKE 1 CAPSULE BY MOUTH TWICE DAILY 90 capsule 1    lidocaine (LIDODERM) 5 % Place 1 patch on the skin as directed by provider Daily. Remove & Discard patch within 12 hours or as directed by MD 20 patch 2    lisinopril (PRINIVIL,ZESTRIL) 40 MG tablet Take 1 tablet by mouth Daily. 90 tablet 3    nitroglycerin (NITROSTAT) 0.4 MG SL tablet Place 1 tablet under the tongue Every 5 (Five) Minutes As Needed for Chest Pain. 12 tablet 0    vitamin B-12 (CYANOCOBALAMIN) 1000 MCG tablet Take 1 tablet by mouth Every Night.      quinapril (ACCUPRIL) 40 MG tablet Take 1 tablet by mouth Daily. 90 tablet 1    ticagrelor (BRILINTA) 90 MG tablet tablet Take 1 tablet by mouth 2 (Two) Times a Day.      vitamin D3 125 MCG (5000 UT)  "capsule capsule Take 1 capsule by mouth Daily.      amLODIPine (NORVASC) 5 MG tablet Take 1 tablet by mouth Daily. (Patient not taking: Reported on 8/14/2023) 90 tablet 3     No facility-administered medications prior to visit.       No opioid medication identified on active medication list. I have reviewed chart for other potential  high risk medication/s and harmful drug interactions in the elderly.        Aspirin is not on active medication list.  Aspirin use is indicated based on review of current medical condition/s. Pros and cons of this therapy have been discussed with this patient. Benefits of this medication outweigh potential harm.  Patient has been instructed to start taking this medication..    Patient Active Problem List   Diagnosis    Screening PSA (prostate specific antigen)    Essential hypertension    Mixed hyperlipidemia    H/O: CVA (cerebrovascular accident)    Stenosis of right carotid artery    Carotid stenosis, asymptomatic, bilateral    Hypogonadism in male    Vitamin D deficiency    Diabetes 1.5, managed as type 2    Type 2 diabetes mellitus without complication, without long-term current use of insulin    Chest discomfort    Non-STEMI (non-ST elevated myocardial infarction)    Medicare annual wellness visit, subsequent     Advance Care Planning   Advance Care Planning     Advance Directive is on file.  ACP discussion was held with the patient during this visit. Patient has an advance directive in EMR which is still valid.      Objective    Vitals:    08/14/23 1255   BP: 119/77   Pulse: 71   Temp: 98.6 øF (37 øC)   TempSrc: Skin   SpO2: 95%   Weight: 92.4 kg (203 lb 9.6 oz)   Height: 175.3 cm (69.02\")     Estimated body mass index is 30.05 kg/mý as calculated from the following:    Height as of this encounter: 175.3 cm (69.02\").    Weight as of this encounter: 92.4 kg (203 lb 9.6 oz).    BMI is >= 30 and <35. (Class 1 Obesity). The following options were offered after discussion;: weight loss " educational material (shared in after visit summary) and exercise counseling/recommendations      Does the patient have evidence of cognitive impairment?   No    Lab Results   Component Value Date    TRIG 177 (H) 2023    HDL 42 2023    LDL 78 2023    VLDL 30 2023    HGBA1C 5.80 (H) 2023          HEALTH RISK ASSESSMENT    Smoking Status:  Social History     Tobacco Use   Smoking Status Every Day    Packs/day: 1.00    Types: Cigarettes   Smokeless Tobacco Never     Alcohol Consumption:  Social History     Substance and Sexual Activity   Alcohol Use Yes    Alcohol/week: 3.0 standard drinks    Types: 3 Standard drinks or equivalent per week    Comment: q 2 weekly      Fall Risk Screen:    MOSHE Fall Risk Assessment was completed, and patient is at LOW risk for falls.Assessment completed on:2023    Depression Screenin/14/2023    12:56 PM   PHQ-2/PHQ-9 Depression Screening   Little Interest or Pleasure in Doing Things 0-->not at all   Feeling Down, Depressed or Hopeless 0-->not at all   PHQ-9: Brief Depression Severity Measure Score 0       Health Habits and Functional and Cognitive Screenin/14/2023    12:00 PM   Functional & Cognitive Status   Do you have difficulty preparing food and eating? No   Do you have difficulty bathing yourself, getting dressed or grooming yourself? No   Do you have difficulty using the toilet? No   Do you have difficulty moving around from place to place? No   Do you have trouble with steps or getting out of a bed or a chair? No   Current Diet Well Balanced Diet   Do you need help using the phone?  No   Are you deaf or do you have serious difficulty hearing?  No   Do you need help to go to places out of walking distance? No   Do you need help shopping? No   Do you need help preparing meals?  No   Do you need help with housework?  No   Do you need help with laundry? No   Do you need help taking your medications? No   Do you need help managing  money? No   Do you ever drive or ride in a car without wearing a seat belt? No   Do you have difficulty concentrating, remembering or making decisions? No       Age-appropriate Screening Schedule:  Refer to the list below for future screening recommendations based on patient's age, sex and/or medical conditions. Orders for these recommended tests are listed in the plan section. The patient has been provided with a written plan.    Health Maintenance   Topic Date Due    URINE MICROALBUMIN  Never done    COLORECTAL CANCER SCREENING  Never done    Pneumococcal Vaccine 65+ (1 - PCV) Never done    TDAP/TD VACCINES (1 - Tdap) Never done    ZOSTER VACCINE (1 of 2) Never done    HEPATITIS C SCREENING  Never done    DIABETIC FOOT EXAM  Never done    DIABETIC EYE EXAM  Never done    COVID-19 Vaccine (4 - Pfizer series) 03/10/2022    AAA SCREEN (ONE-TIME)  Never done    INFLUENZA VACCINE  10/01/2023    HEMOGLOBIN A1C  02/08/2024    LIPID PANEL  08/08/2024    ANNUAL WELLNESS VISIT  08/14/2024                  CMS Preventative Services Quick Reference  Risk Factors Identified During Encounter:    Depression/Dysphoria: Current medication is effective, no change recommended  Tobacco Use/Dependance Risk (use dotphrase .tobaccocessation for documentation)    The above risks/problems have been discussed with the patient.  Pertinent information has been shared with the patient in the After Visit Summary.    Diagnoses and all orders for this visit:    1. Medicare annual wellness visit, subsequent (Primary)  -     Comprehensive Metabolic Panel; Future  -     CBC & Differential; Future  -     Hemoglobin A1c; Future  -     Lipid Panel; Future    2. Type 2 diabetes mellitus without complication, without long-term current use of insulin  -     Comprehensive Metabolic Panel; Future  -     CBC & Differential; Future  -     Hemoglobin A1c; Future  -     Lipid Panel; Future    3. Mixed hyperlipidemia  -     Comprehensive Metabolic Panel;  Future  -     CBC & Differential; Future  -     Hemoglobin A1c; Future  -     Lipid Panel; Future    4. H/O: CVA (cerebrovascular accident)  -     Comprehensive Metabolic Panel; Future  -     CBC & Differential; Future  -     Hemoglobin A1c; Future  -     Lipid Panel; Future    5. Screening PSA (prostate specific antigen)  -     Comprehensive Metabolic Panel; Future  -     CBC & Differential; Future  -     Hemoglobin A1c; Future  -     Lipid Panel; Future    6. Vitamin D deficiency  -     Comprehensive Metabolic Panel; Future  -     CBC & Differential; Future  -     Hemoglobin A1c; Future  -     Lipid Panel; Future    7. Non-STEMI (non-ST elevated myocardial infarction)  -     Comprehensive Metabolic Panel; Future  -     CBC & Differential; Future  -     Hemoglobin A1c; Future  -     Lipid Panel; Future    8. Essential hypertension  -     Comprehensive Metabolic Panel; Future  -     CBC & Differential; Future  -     Hemoglobin A1c; Future  -     Lipid Panel; Future    9. Carotid stenosis, asymptomatic, bilateral  -     Comprehensive Metabolic Panel; Future  -     CBC & Differential; Future  -     Hemoglobin A1c; Future  -     Lipid Panel; Future    Other orders  -     ezetimibe (Zetia) 10 MG tablet; Take 1 tablet by mouth Daily.  Dispense: 90 tablet; Refill: 3        Follow Up:   Next Medicare Wellness visit to be scheduled in 1 year.      An After Visit Summary and PPPS were made available to the patient.

## 2023-08-14 NOTE — PROGRESS NOTES
"CHIEF COMPLAINT/ HPI:  Hypertension and Follow-up (Pt states that this is routine, he had labs. He has no new issues. )              Objective   Vital Signs  Vitals:    08/14/23 1255   BP: 119/77   Pulse: 71   Temp: 98.6 øF (37 øC)   TempSrc: Skin   SpO2: 95%   Weight: 92.4 kg (203 lb 9.6 oz)   Height: 175.3 cm (69.02\")      Body mass index is 30.05 kg/mý.  Review of Systems   Constitutional: Negative.    HENT: Negative.     Eyes: Negative.    Respiratory: Negative.     Cardiovascular: Negative.    Gastrointestinal: Negative.    Endocrine: Negative.    Genitourinary: Negative.    Musculoskeletal: Negative.    Allergic/Immunologic: Negative.    Neurological: Negative.    Hematological: Negative.    Psychiatric/Behavioral: Negative.      Physical Exam  Constitutional:       General: He is not in acute distress.     Appearance: Normal appearance.   HENT:      Head: Normocephalic.      Mouth/Throat:      Mouth: Mucous membranes are moist.   Eyes:      Conjunctiva/sclera: Conjunctivae normal.      Pupils: Pupils are equal, round, and reactive to light.   Cardiovascular:      Rate and Rhythm: Normal rate and regular rhythm.      Pulses: Normal pulses.      Heart sounds: Normal heart sounds.   Pulmonary:      Effort: Pulmonary effort is normal.      Breath sounds: Normal breath sounds.   Abdominal:      General: Abdomen is flat. Bowel sounds are normal.      Palpations: Abdomen is soft.   Musculoskeletal:         General: No swelling. Normal range of motion.      Cervical back: Neck supple.   Skin:     General: Skin is warm and dry.      Coloration: Skin is not jaundiced.   Neurological:      General: No focal deficit present.      Mental Status: He is alert and oriented to person, place, and time. Mental status is at baseline.   Psychiatric:         Mood and Affect: Mood normal.         Behavior: Behavior normal.         Thought Content: Thought content normal.         Judgment: Judgment normal.      Result Review :   Lab " Results   Component Value Date    PROBNP 2,625.0 (H) 11/08/2022     CMP          1/24/2023    07:42 2/2/2023    12:46 8/8/2023    11:46   CMP   Glucose 111  101  102    BUN 24  18  20    Creatinine 1.05  1.14  1.06    EGFR 77.3  70.1  76.4    Sodium 139  140  139    Potassium 4.3  4.5  4.7    Chloride 106  105  105    Calcium 9.1  8.9  9.5    Total Protein  7.1  7.0    Albumin  4.2  4.1    Globulin  2.9  2.9    Total Bilirubin  0.5  0.4    Alkaline Phosphatase  63  63    AST (SGOT)  18  19    ALT (SGPT)  17  20    Albumin/Globulin Ratio  1.4  1.4    BUN/Creatinine Ratio 22.9  15.8  18.9    Anion Gap 10.1  8.8  9.3      CBC w/diff          1/24/2023    07:42 2/2/2023    12:46 8/8/2023    11:46   CBC w/Diff   WBC 9.10  7.28  6.48    RBC 4.41  4.63  4.50    Hemoglobin 15.0  15.6  15.3    Hematocrit 43.8  46.2  44.8    MCV 99.3  99.8  99.6    MCH 34.0  33.7  34.0    MCHC 34.2  33.8  34.2    RDW 13.4  12.9  12.2    Platelets 224  242  218    Neutrophil Rel %  61.9  54.1    Immature Granulocyte Rel %  0.4  0.3    Lymphocyte Rel %  24.6  30.1    Monocyte Rel %  6.6  7.1    Eosinophil Rel %  5.8  7.3    Basophil Rel %  0.7  1.1       Lipid Panel          11/9/2022    04:29 2/2/2023    12:46 8/8/2023    11:46   Lipid Panel   Total Cholesterol 130  147  150    Triglycerides 182  174  177    HDL Cholesterol 42  39  42    VLDL Cholesterol 30  30  30    LDL Cholesterol  58  78  78    LDL/HDL Ratio 1.23  1.88  1.73       Lab Results   Component Value Date    TSH 2.410 07/22/2021    TSH 1.810 07/14/2020      No results found for: FREET4   A1C Last 3 Results          2/2/2023    12:46 8/8/2023    11:46   HGBA1C Last 3 Results   Hemoglobin A1C 5.80  5.80       PSA          8/8/2023    11:46   PSA   PSA 2.680                     Visit Diagnoses:    ICD-10-CM ICD-9-CM   1. Medicare annual wellness visit, subsequent  Z00.00 V70.0   2. Type 2 diabetes mellitus without complication, without long-term current use of insulin  E11.9 250.00    3. Mixed hyperlipidemia  E78.2 272.2   4. H/O: CVA (cerebrovascular accident)  Z86.73 V12.54   5. Screening PSA (prostate specific antigen)  Z12.5 V76.44   6. Vitamin D deficiency  E55.9 268.9   7. Non-STEMI (non-ST elevated myocardial infarction)  I21.4 410.70   8. Essential hypertension  I10 401.9   9. Carotid stenosis, asymptomatic, bilateral  I65.23 433.10     433.30       Assessment and Plan   Diagnoses and all orders for this visit:    1. Medicare annual wellness visit, subsequent (Primary)  -     Comprehensive Metabolic Panel; Future  -     CBC & Differential; Future  -     Hemoglobin A1c; Future  -     Lipid Panel; Future    2. Type 2 diabetes mellitus without complication, without long-term current use of insulin  -     Comprehensive Metabolic Panel; Future  -     CBC & Differential; Future  -     Hemoglobin A1c; Future  -     Lipid Panel; Future    3. Mixed hyperlipidemia  -     Comprehensive Metabolic Panel; Future  -     CBC & Differential; Future  -     Hemoglobin A1c; Future  -     Lipid Panel; Future    4. H/O: CVA (cerebrovascular accident)  -     Comprehensive Metabolic Panel; Future  -     CBC & Differential; Future  -     Hemoglobin A1c; Future  -     Lipid Panel; Future    5. Screening PSA (prostate specific antigen)  -     Comprehensive Metabolic Panel; Future  -     CBC & Differential; Future  -     Hemoglobin A1c; Future  -     Lipid Panel; Future    6. Vitamin D deficiency  -     Comprehensive Metabolic Panel; Future  -     CBC & Differential; Future  -     Hemoglobin A1c; Future  -     Lipid Panel; Future    7. Non-STEMI (non-ST elevated myocardial infarction)  -     Comprehensive Metabolic Panel; Future  -     CBC & Differential; Future  -     Hemoglobin A1c; Future  -     Lipid Panel; Future    8. Essential hypertension  -     Comprehensive Metabolic Panel; Future  -     CBC & Differential; Future  -     Hemoglobin A1c; Future  -     Lipid Panel; Future    9. Carotid stenosis,  asymptomatic, bilateral  -     Comprehensive Metabolic Panel; Future  -     CBC & Differential; Future  -     Hemoglobin A1c; Future  -     Lipid Panel; Future    Other orders  -     ezetimibe (Zetia) 10 MG tablet; Take 1 tablet by mouth Daily.  Dispense: 90 tablet; Refill: 3             Hypertension, currently off amlodipine, continues lisinopril 40 mg daily    Chest pressure chest tightness questionable, November during a stressful event, ended up going to the emergency room, November, 2022, was referred to cardiology, stress test recently done January 24, 2023,, left heart cath showed minimal coronary artery disease normal ejection fraction 55%, mild luminal regularities, chest x-ray November 2022 no active disease,    Hemorrhoids,  to Dr. Randhawa general surgery, August 3, 2022    Anxiety/ stress, fibromyalgia--continues on Cymbalta 60 mg daily,    Mild vitamin D3, B12, folate deficiencies continues ot counter supplements    Impaired fasting glucose,/ dm 2 --- hemoglobin A1c 5.8 February 2, 2023, stable again August 2023    Carotid stenosis right artery--80-99, -% feb 2018, - S/P R CEA surgery August 29, 2019 in [place] ----cardiac stress testing August 21 in [place] and echocardiogram------dr loren lowery at [facility]--   Cont plavix ---, Follow-up CAROTIDS jUNE 2020 WITH 50% STENOSIS, right-sided stable July 2020, Patient continues to follow-up with vascular surgery, --continues yearly follow-up now as of July 2022    Heart murmur, questionable previous echo August 2019 showed normal ejection fraction, no significant valve abnormalities reported, consider repeating test at some point    Low testosterone Low again 143, July 2021, -- PSA at 2.1 July 2022     CAD, REMOTE MI--    Mixed hyperlipidemia, --CHOL GOOD, -continue-lipitor 20 mg qd , Plavix continues, discussed goal of LDL cholesterol to be below 70 if possible, currently at 78 August 8, 2023, will add Zetia to his medical regimen to get the LDL as  low as possible August 8, 2023    OLD STROKEs- 2004-MILD COG IMPAIRMENT, L UE WEAKNESS,? VERY MILD INTERMITTENT DYSPHAGIA    LUMBAR- CERVICAL DJD-- PRIOR LUMBAR DISC SURGERY    C SCOPE 12/15, DUSTY, 2 X POLYPS    Ultrasound aorta -- vascular surgery clinic in [place]----- NO significant aneurysm August 2019,    DISCUSSED LOW DOSE CT SCREENING FOR LUNG CA SCREENING feb 2022    PSA 2.6, August 8, 2023      Follow Up   Return in about 6 months (around 2/14/2024).  Patient was given instructions and counseling regarding his condition or for health maintenance advice. Please see specific information pulled into the AVS if appropriate.

## 2023-08-17 RX ORDER — CLOPIDOGREL BISULFATE 75 MG/1
TABLET ORAL
Qty: 90 TABLET | Refills: 1 | Status: SHIPPED | OUTPATIENT
Start: 2023-08-17

## 2023-08-22 DIAGNOSIS — M54.50 ACUTE MIDLINE LOW BACK PAIN WITHOUT SCIATICA: ICD-10-CM

## 2023-08-22 RX ORDER — LIDOCAINE 50 MG/G
1 PATCH TOPICAL EVERY 24 HOURS
Qty: 20 PATCH | Refills: 2 | Status: SHIPPED | OUTPATIENT
Start: 2023-08-22

## 2023-08-22 RX ORDER — NITROGLYCERIN 0.4 MG/1
0.4 TABLET SUBLINGUAL
Qty: 12 TABLET | Refills: 0 | Status: SHIPPED | OUTPATIENT
Start: 2023-08-22

## 2023-08-22 NOTE — TELEPHONE ENCOUNTER
Caller: SLIME FERNANDEZ    Relationship: Emergency Contact    Best call back number: [phone]     Requested Prescriptions:   Requested Prescriptions     Pending Prescriptions Disp Refills    nitroglycerin (NITROSTAT) 0.4 MG SL tablet 12 tablet 0     Sig: Place 1 tablet under the tongue Every 5 (Five) Minutes As Needed for Chest Pain.    lidocaine (LIDODERM) 5 % 20 patch 2     Sig: Place 1 patch on the skin as directed by provider Daily. Remove & Discard patch within 12 hours or as directed by MD        Pharmacy where request should be sent: [id number] - [place], [place] - [address] AT [facility] [phone] [facility] [phone] [facility]     Last office visit with prescribing clinician: 8/14/2023   Last telemedicine visit with prescribing clinician: Visit date not found   Next office visit with prescribing clinician: 2/15/2024         Does the patient have less than a 3 day supply:  [x] Yes  [] No    Would you like a call back once the refill request has been completed: [] Yes [] No    If the office needs to give you a call back, can they leave a voicemail: [] Yes [] No    Bobby Hamilton Rep   08/22/23 12:52 EDT

## 2023-11-05 DIAGNOSIS — E78.2 MIXED HYPERLIPIDEMIA: ICD-10-CM

## 2023-11-06 RX ORDER — ATORVASTATIN CALCIUM 20 MG/1
20 TABLET, FILM COATED ORAL NIGHTLY
Qty: 90 TABLET | Refills: 1 | Status: SHIPPED | OUTPATIENT
Start: 2023-11-06

## 2023-12-01 RX ORDER — LISINOPRIL 40 MG/1
40 TABLET ORAL DAILY
Qty: 90 TABLET | Refills: 3 | Status: SHIPPED | OUTPATIENT
Start: 2023-12-01

## 2024-02-15 ENCOUNTER — LAB (OUTPATIENT)
Dept: LAB | Facility: HOSPITAL | Age: 69
End: 2024-02-15
Payer: MEDICARE

## 2024-02-15 DIAGNOSIS — Z86.73 H/O: CVA (CEREBROVASCULAR ACCIDENT): ICD-10-CM

## 2024-02-15 DIAGNOSIS — I65.23 CAROTID STENOSIS, ASYMPTOMATIC, BILATERAL: ICD-10-CM

## 2024-02-15 DIAGNOSIS — E55.9 VITAMIN D DEFICIENCY: ICD-10-CM

## 2024-02-15 DIAGNOSIS — E11.9 TYPE 2 DIABETES MELLITUS WITHOUT COMPLICATION, WITHOUT LONG-TERM CURRENT USE OF INSULIN: ICD-10-CM

## 2024-02-15 DIAGNOSIS — Z12.5 SCREENING PSA (PROSTATE SPECIFIC ANTIGEN): ICD-10-CM

## 2024-02-15 DIAGNOSIS — Z00.00 MEDICARE ANNUAL WELLNESS VISIT, SUBSEQUENT: ICD-10-CM

## 2024-02-15 DIAGNOSIS — E78.2 MIXED HYPERLIPIDEMIA: ICD-10-CM

## 2024-02-15 DIAGNOSIS — I21.4 NON-STEMI (NON-ST ELEVATED MYOCARDIAL INFARCTION): ICD-10-CM

## 2024-02-15 DIAGNOSIS — I10 ESSENTIAL HYPERTENSION: ICD-10-CM

## 2024-02-15 LAB
ALBUMIN SERPL-MCNC: 4.3 G/DL (ref 3.5–5.2)
ALBUMIN/GLOB SERPL: 1.7 G/DL
ALP SERPL-CCNC: 58 U/L (ref 39–117)
ALT SERPL W P-5'-P-CCNC: 38 U/L (ref 1–41)
ANION GAP SERPL CALCULATED.3IONS-SCNC: 10.8 MMOL/L (ref 5–15)
AST SERPL-CCNC: 30 U/L (ref 1–40)
BASOPHILS # BLD AUTO: 0.04 10*3/MM3 (ref 0–0.2)
BASOPHILS NFR BLD AUTO: 0.6 % (ref 0–1.5)
BILIRUB SERPL-MCNC: 0.4 MG/DL (ref 0–1.2)
BUN SERPL-MCNC: 24 MG/DL (ref 8–23)
BUN/CREAT SERPL: 22 (ref 7–25)
CALCIUM SPEC-SCNC: 9 MG/DL (ref 8.6–10.5)
CHLORIDE SERPL-SCNC: 107 MMOL/L (ref 98–107)
CHOLEST SERPL-MCNC: 108 MG/DL (ref 0–200)
CO2 SERPL-SCNC: 23.2 MMOL/L (ref 22–29)
CREAT SERPL-MCNC: 1.09 MG/DL (ref 0.76–1.27)
DEPRECATED RDW RBC AUTO: 46.6 FL (ref 37–54)
EGFRCR SERPLBLD CKD-EPI 2021: 73.5 ML/MIN/1.73
EOSINOPHIL # BLD AUTO: 0.41 10*3/MM3 (ref 0–0.4)
EOSINOPHIL NFR BLD AUTO: 6.5 % (ref 0.3–6.2)
ERYTHROCYTE [DISTWIDTH] IN BLOOD BY AUTOMATED COUNT: 12.7 % (ref 12.3–15.4)
GLOBULIN UR ELPH-MCNC: 2.5 GM/DL
GLUCOSE SERPL-MCNC: 100 MG/DL (ref 65–99)
HBA1C MFR BLD: 6.2 % (ref 4.8–5.6)
HCT VFR BLD AUTO: 44.7 % (ref 37.5–51)
HDLC SERPL-MCNC: 39 MG/DL (ref 40–60)
HGB BLD-MCNC: 15.4 G/DL (ref 13–17.7)
IMM GRANULOCYTES # BLD AUTO: 0.02 10*3/MM3 (ref 0–0.05)
IMM GRANULOCYTES NFR BLD AUTO: 0.3 % (ref 0–0.5)
LDLC SERPL CALC-MCNC: 44 MG/DL (ref 0–100)
LDLC/HDLC SERPL: 1.02 {RATIO}
LYMPHOCYTES # BLD AUTO: 1.78 10*3/MM3 (ref 0.7–3.1)
LYMPHOCYTES NFR BLD AUTO: 28.3 % (ref 19.6–45.3)
MCH RBC QN AUTO: 34.5 PG (ref 26.6–33)
MCHC RBC AUTO-ENTMCNC: 34.5 G/DL (ref 31.5–35.7)
MCV RBC AUTO: 100.2 FL (ref 79–97)
MONOCYTES # BLD AUTO: 0.39 10*3/MM3 (ref 0.1–0.9)
MONOCYTES NFR BLD AUTO: 6.2 % (ref 5–12)
NEUTROPHILS NFR BLD AUTO: 3.66 10*3/MM3 (ref 1.7–7)
NEUTROPHILS NFR BLD AUTO: 58.1 % (ref 42.7–76)
NRBC BLD AUTO-RTO: 0 /100 WBC (ref 0–0.2)
PLATELET # BLD AUTO: 238 10*3/MM3 (ref 140–450)
PMV BLD AUTO: 10.1 FL (ref 6–12)
POTASSIUM SERPL-SCNC: 4.8 MMOL/L (ref 3.5–5.2)
PROT SERPL-MCNC: 6.8 G/DL (ref 6–8.5)
RBC # BLD AUTO: 4.46 10*6/MM3 (ref 4.14–5.8)
SODIUM SERPL-SCNC: 141 MMOL/L (ref 136–145)
TRIGL SERPL-MCNC: 147 MG/DL (ref 0–150)
VLDLC SERPL-MCNC: 25 MG/DL (ref 5–40)
WBC NRBC COR # BLD AUTO: 6.3 10*3/MM3 (ref 3.4–10.8)

## 2024-02-15 PROCEDURE — 80053 COMPREHEN METABOLIC PANEL: CPT

## 2024-02-15 PROCEDURE — 36415 COLL VENOUS BLD VENIPUNCTURE: CPT

## 2024-02-15 PROCEDURE — 80061 LIPID PANEL: CPT

## 2024-02-15 PROCEDURE — 83036 HEMOGLOBIN GLYCOSYLATED A1C: CPT

## 2024-02-15 PROCEDURE — 85025 COMPLETE CBC W/AUTO DIFF WBC: CPT

## 2024-02-19 ENCOUNTER — OFFICE VISIT (OUTPATIENT)
Dept: INTERNAL MEDICINE | Facility: CLINIC | Age: 69
End: 2024-02-19
Payer: MEDICARE

## 2024-02-19 VITALS
BODY MASS INDEX: 31.87 KG/M2 | HEART RATE: 64 BPM | TEMPERATURE: 98.4 F | OXYGEN SATURATION: 96 % | SYSTOLIC BLOOD PRESSURE: 144 MMHG | HEIGHT: 69 IN | WEIGHT: 215.2 LBS | DIASTOLIC BLOOD PRESSURE: 82 MMHG

## 2024-02-19 DIAGNOSIS — E78.2 MIXED HYPERLIPIDEMIA: ICD-10-CM

## 2024-02-19 DIAGNOSIS — E29.1 HYPOGONADISM IN MALE: ICD-10-CM

## 2024-02-19 DIAGNOSIS — Z12.5 SCREENING PSA (PROSTATE SPECIFIC ANTIGEN): ICD-10-CM

## 2024-02-19 DIAGNOSIS — I21.4 NON-STEMI (NON-ST ELEVATED MYOCARDIAL INFARCTION): ICD-10-CM

## 2024-02-19 DIAGNOSIS — Z86.73 H/O: CVA (CEREBROVASCULAR ACCIDENT): ICD-10-CM

## 2024-02-19 DIAGNOSIS — I10 ESSENTIAL HYPERTENSION: Primary | ICD-10-CM

## 2024-02-19 DIAGNOSIS — I65.21 STENOSIS OF RIGHT CAROTID ARTERY: ICD-10-CM

## 2024-02-19 DIAGNOSIS — E55.9 VITAMIN D DEFICIENCY: ICD-10-CM

## 2024-02-19 DIAGNOSIS — E11.9 TYPE 2 DIABETES MELLITUS WITHOUT COMPLICATION, WITHOUT LONG-TERM CURRENT USE OF INSULIN: ICD-10-CM

## 2024-02-19 PROCEDURE — G2211 COMPLEX E/M VISIT ADD ON: HCPCS | Performed by: INTERNAL MEDICINE

## 2024-02-19 PROCEDURE — 3079F DIAST BP 80-89 MM HG: CPT | Performed by: INTERNAL MEDICINE

## 2024-02-19 PROCEDURE — 99214 OFFICE O/P EST MOD 30 MIN: CPT | Performed by: INTERNAL MEDICINE

## 2024-02-19 PROCEDURE — 3077F SYST BP >= 140 MM HG: CPT | Performed by: INTERNAL MEDICINE

## 2024-02-19 PROCEDURE — 3044F HG A1C LEVEL LT 7.0%: CPT | Performed by: INTERNAL MEDICINE

## 2024-02-19 NOTE — PROGRESS NOTES
"CHIEF COMPLAINT/ HPI:  Hyperlipidemia, Diabetes Mellitus, Hypertension, and Follow-up    Patient did follow-up with vascular surgery recently, got a good report he said stable, he is here to follow-up with lab work, staying fairly active,          Objective   Vital Signs  Vitals:    02/19/24 1345   BP: 144/82   BP Location: Left arm   Patient Position: Sitting   Cuff Size: Adult   Pulse: 64   Temp: 98.4 °F (36.9 °C)   TempSrc: Temporal   SpO2: 96%   Weight: 97.6 kg (215 lb 3.2 oz)   Height: 175.3 cm (69.02\")      Body mass index is 31.76 kg/m².  Review of Systems   Constitutional: Negative.    HENT: Negative.     Eyes: Negative.    Respiratory: Negative.     Cardiovascular: Negative.    Gastrointestinal: Negative.    Endocrine: Negative.    Genitourinary: Negative.    Musculoskeletal: Negative.    Allergic/Immunologic: Negative.    Neurological: Negative.    Hematological: Negative.    Psychiatric/Behavioral: Negative.        Physical Exam  Constitutional:       General: He is not in acute distress.     Appearance: Normal appearance.   HENT:      Head: Normocephalic.      Mouth/Throat:      Mouth: Mucous membranes are moist.   Eyes:      Conjunctiva/sclera: Conjunctivae normal.      Pupils: Pupils are equal, round, and reactive to light.   Cardiovascular:      Rate and Rhythm: Normal rate and regular rhythm.      Pulses: Normal pulses.      Heart sounds: Normal heart sounds.   Pulmonary:      Effort: Pulmonary effort is normal.      Breath sounds: Normal breath sounds.   Abdominal:      General: Abdomen is flat. Bowel sounds are normal.      Palpations: Abdomen is soft.   Musculoskeletal:         General: No swelling. Normal range of motion.      Cervical back: Neck supple.   Skin:     General: Skin is warm and dry.      Coloration: Skin is not jaundiced.   Neurological:      General: No focal deficit present.      Mental Status: He is alert and oriented to person, place, and time. Mental status is at baseline. " "  Psychiatric:         Mood and Affect: Mood normal.         Behavior: Behavior normal.         Thought Content: Thought content normal.         Judgment: Judgment normal.        Result Review :   Lab Results   Component Value Date    PROBNP 2,625.0 (H) 11/08/2022     CMP          8/8/2023    11:46 2/15/2024    14:26   CMP   Glucose 102  100    BUN 20  24    Creatinine 1.06  1.09    EGFR 76.4  73.5    Sodium 139  141    Potassium 4.7  4.8    Chloride 105  107    Calcium 9.5  9.0    Total Protein 7.0  6.8    Albumin 4.1  4.3    Globulin 2.9  2.5    Total Bilirubin 0.4  0.4    Alkaline Phosphatase 63  58    AST (SGOT) 19  30    ALT (SGPT) 20  38    Albumin/Globulin Ratio 1.4  1.7    BUN/Creatinine Ratio 18.9  22.0    Anion Gap 9.3  10.8      CBC w/diff          8/8/2023    11:46 2/15/2024    14:26   CBC w/Diff   WBC 6.48  6.30    RBC 4.50  4.46    Hemoglobin 15.3  15.4    Hematocrit 44.8  44.7    MCV 99.6  100.2    MCH 34.0  34.5    MCHC 34.2  34.5    RDW 12.2  12.7    Platelets 218  238    Neutrophil Rel % 54.1  58.1    Immature Granulocyte Rel % 0.3  0.3    Lymphocyte Rel % 30.1  28.3    Monocyte Rel % 7.1  6.2    Eosinophil Rel % 7.3  6.5    Basophil Rel % 1.1  0.6       Lipid Panel          8/8/2023    11:46 2/15/2024    14:26   Lipid Panel   Total Cholesterol 150  108    Triglycerides 177  147    HDL Cholesterol 42  39    VLDL Cholesterol 30  25    LDL Cholesterol  78  44    LDL/HDL Ratio 1.73  1.02       Lab Results   Component Value Date    TSH 2.410 07/22/2021    TSH 1.810 07/14/2020      No results found for: \"FREET4\"   A1C Last 3 Results          8/8/2023    11:46 2/15/2024    14:26   HGBA1C Last 3 Results   Hemoglobin A1C 5.80  6.20       PSA          8/8/2023    11:46   PSA   PSA 2.680                     Visit Diagnoses:    ICD-10-CM ICD-9-CM   1. Essential hypertension  I10 401.9   2. H/O: CVA (cerebrovascular accident)  Z86.73 V12.54   3. Type 2 diabetes mellitus without complication, without long-term " current use of insulin  E11.9 250.00   4. Non-STEMI (non-ST elevated myocardial infarction)  I21.4 410.70   5. Stenosis of right carotid artery  I65.21 433.10   6. Screening PSA (prostate specific antigen)  Z12.5 V76.44   7. Mixed hyperlipidemia  E78.2 272.2   8. Vitamin D deficiency  E55.9 268.9   9. Hypogonadism in male  E29.1 257.2       Assessment and Plan   Diagnoses and all orders for this visit:    1. Essential hypertension (Primary)  -     Comprehensive Metabolic Panel; Future  -     CBC & Differential; Future  -     Hemoglobin A1c; Future  -     Lipid Panel; Future  -     PSA Screen; Future  -     MicroAlbumin, Urine, Random - Urine, Clean Catch; Future    2. H/O: CVA (cerebrovascular accident)  -     Comprehensive Metabolic Panel; Future  -     CBC & Differential; Future  -     Hemoglobin A1c; Future  -     Lipid Panel; Future  -     PSA Screen; Future  -     MicroAlbumin, Urine, Random - Urine, Clean Catch; Future    3. Type 2 diabetes mellitus without complication, without long-term current use of insulin  -     Comprehensive Metabolic Panel; Future  -     CBC & Differential; Future  -     Hemoglobin A1c; Future  -     Lipid Panel; Future  -     PSA Screen; Future  -     MicroAlbumin, Urine, Random - Urine, Clean Catch; Future    4. Non-STEMI (non-ST elevated myocardial infarction)  -     Comprehensive Metabolic Panel; Future  -     CBC & Differential; Future  -     Hemoglobin A1c; Future  -     Lipid Panel; Future  -     PSA Screen; Future  -     MicroAlbumin, Urine, Random - Urine, Clean Catch; Future    5. Stenosis of right carotid artery  -     Comprehensive Metabolic Panel; Future  -     CBC & Differential; Future  -     Hemoglobin A1c; Future  -     Lipid Panel; Future  -     PSA Screen; Future  -     MicroAlbumin, Urine, Random - Urine, Clean Catch; Future    6. Screening PSA (prostate specific antigen)  -     Comprehensive Metabolic Panel; Future  -     CBC & Differential; Future  -      Hemoglobin A1c; Future  -     Lipid Panel; Future  -     PSA Screen; Future  -     MicroAlbumin, Urine, Random - Urine, Clean Catch; Future    7. Mixed hyperlipidemia  -     Comprehensive Metabolic Panel; Future  -     CBC & Differential; Future  -     Hemoglobin A1c; Future  -     Lipid Panel; Future  -     PSA Screen; Future  -     MicroAlbumin, Urine, Random - Urine, Clean Catch; Future    8. Vitamin D deficiency  -     Comprehensive Metabolic Panel; Future  -     CBC & Differential; Future  -     Hemoglobin A1c; Future  -     Lipid Panel; Future  -     PSA Screen; Future  -     MicroAlbumin, Urine, Random - Urine, Clean Catch; Future    9. Hypogonadism in male  -     Comprehensive Metabolic Panel; Future  -     CBC & Differential; Future  -     Hemoglobin A1c; Future  -     Lipid Panel; Future  -     PSA Screen; Future  -     MicroAlbumin, Urine, Random - Urine, Clean Catch; Future        Hypertension, continues lisinopril 40 mg daily    Chest pressure chest tightness questionable, November during a stressful event, ended up going to the emergency room, November, 2022, was referred to cardiology, stress test --- January 24, 2023,, left heart cath showed minimal coronary artery disease normal ejection fraction 55%, mild luminal regularities, chest x-ray November 2022 no active disease,    Hemorrhoids,  to Dr. Randhawa general surgery, August 3, 2022    Anxiety/ stress, fibromyalgia--continues Cymbalta 60 mg daily,    Mild vitamin D3, B12, folate deficiencies continues counter supplements    Impaired fasting glucose,/ dm 2 --- hemoglobin A1c 6.2--up slightly, February 2024,    Carotid stenosis right artery--80-99, -% feb 2018, - S/P R CEA surgery August 29, 2019 in [place] ----cardiac stress testing August 21 in [place] and echocardiogram------dr loren lowery at [facility]--   Cont plavix ---, Follow-up CAROTIDS,  jUNE 2020 WITH 50% STENOSIS, right-sided stable July 2020, follow-up with vascular surgery,  --continues yearly follow-up     Heart murmur, questionable previous echo August 2019 showed normal ejection fraction, no significant valve abnormalities reported, consider repeating test at some point    Low testosterone== 143, July 2021, -- PSA at 2.1 July 2022     CAD, REMOTE MI--    Mixed hyperlipidemia, -continue-lipitor 20 mg qd ,Zetia --much improved February 2024,    OLD STROKEs- 2004-MILD COG IMPAIRMENT, L UE WEAKNESS,? VERY MILD INTERMITTENT DYSPHAGIA    LUMBAR- CERVICAL DJD-- PRIOR LUMBAR DISC SURGERY    C SCOPE 12/15, DUSTY, 2 X POLYPS    Ultrasound aorta -- vascular surgery clinic in [place]----- NO significant aneurysm August 2019,    DISCUSSED LOW DOSE CT SCREENING FOR LUNG CA SCREENING feb 2022    PSA 2.6, August 8, 2023               Follow Up   Return in about 6 months (around 8/19/2024).  Patient was given instructions and counseling regarding his condition or for health maintenance advice. Please see specific information pulled into the AVS if appropriate.

## 2024-03-11 RX ORDER — CLOPIDOGREL BISULFATE 75 MG/1
TABLET ORAL
Qty: 90 TABLET | Refills: 1 | Status: SHIPPED | OUTPATIENT
Start: 2024-03-11

## 2024-04-01 DIAGNOSIS — I65.23 BILATERAL CAROTID ARTERY STENOSIS: Primary | ICD-10-CM

## 2024-04-19 RX ORDER — DULOXETIN HYDROCHLORIDE 60 MG/1
60 CAPSULE, DELAYED RELEASE ORAL NIGHTLY
Qty: 90 CAPSULE | Refills: 1 | Status: SHIPPED | OUTPATIENT
Start: 2024-04-19

## 2024-05-13 RX ORDER — EZETIMIBE 10 MG/1
10 TABLET ORAL DAILY
Qty: 90 TABLET | Refills: 3 | Status: SHIPPED | OUTPATIENT
Start: 2024-05-13

## 2024-05-19 DIAGNOSIS — E78.2 MIXED HYPERLIPIDEMIA: ICD-10-CM

## 2024-05-20 RX ORDER — ATORVASTATIN CALCIUM 20 MG/1
20 TABLET, FILM COATED ORAL NIGHTLY
Qty: 90 TABLET | Refills: 1 | Status: SHIPPED | OUTPATIENT
Start: 2024-05-20

## 2024-05-29 ENCOUNTER — TELEPHONE (OUTPATIENT)
Dept: INTERNAL MEDICINE | Age: 69
End: 2024-05-29

## 2024-05-29 NOTE — TELEPHONE ENCOUNTER
Caller: Aram Sarmiento    Relationship: Self    Best call back number: [phone]     What medication are you requesting: SOMETHING TO HELP WITH MOTIVATION    What are your current symptoms: LACKING MOTIVATION    How long have you been experiencing symptoms: SEVERAL WEEKS    Have you had these symptoms before:    [] Yes  [] No    Have you been treated for these symptoms before:   [] Yes  [] No    If a prescription is needed, what is your preferred pharmacy and phone number: [id number] - [facility], [place] - [address] AT [facility] [phone] [facility] [phone]      Additional notes: PATIENT STATES THAT HE HAS BEEN HAVING A LOT OF TROUBLE THE PAST FEW WEEKS WITH BEING MOTIVATED. PATIENT STATES HE CAN'T SEEM TO GET HIMSELF GOING. PATIENT WANTING TO KNOW IF THERE WAS ANYTHING DR. GONSALVES COULD PRESCRIBE TO HELP HIM OUT. PLEASE ADVISE.

## 2024-05-29 NOTE — TELEPHONE ENCOUNTER
Okay to see patient at my next available opening or make him an appointment with anyone in the office I could see him to discuss treatment options for his motivation possible depression etc.

## 2024-08-16 ENCOUNTER — LAB (OUTPATIENT)
Dept: LAB | Facility: HOSPITAL | Age: 69
End: 2024-08-16
Payer: MEDICARE

## 2024-08-16 DIAGNOSIS — I10 ESSENTIAL HYPERTENSION: ICD-10-CM

## 2024-08-16 DIAGNOSIS — E78.2 MIXED HYPERLIPIDEMIA: ICD-10-CM

## 2024-08-16 DIAGNOSIS — Z86.73 H/O: CVA (CEREBROVASCULAR ACCIDENT): ICD-10-CM

## 2024-08-16 DIAGNOSIS — Z12.5 SCREENING PSA (PROSTATE SPECIFIC ANTIGEN): ICD-10-CM

## 2024-08-16 DIAGNOSIS — E29.1 HYPOGONADISM IN MALE: ICD-10-CM

## 2024-08-16 DIAGNOSIS — I21.4 NON-STEMI (NON-ST ELEVATED MYOCARDIAL INFARCTION): ICD-10-CM

## 2024-08-16 DIAGNOSIS — E11.9 TYPE 2 DIABETES MELLITUS WITHOUT COMPLICATION, WITHOUT LONG-TERM CURRENT USE OF INSULIN: ICD-10-CM

## 2024-08-16 DIAGNOSIS — E55.9 VITAMIN D DEFICIENCY: ICD-10-CM

## 2024-08-16 DIAGNOSIS — I65.21 STENOSIS OF RIGHT CAROTID ARTERY: ICD-10-CM

## 2024-08-16 LAB
ALBUMIN SERPL-MCNC: 4.1 G/DL (ref 3.5–5.2)
ALBUMIN/GLOB SERPL: 1.5 G/DL
ALP SERPL-CCNC: 54 U/L (ref 39–117)
ALT SERPL W P-5'-P-CCNC: 30 U/L (ref 1–41)
ANION GAP SERPL CALCULATED.3IONS-SCNC: 10 MMOL/L (ref 5–15)
AST SERPL-CCNC: 25 U/L (ref 1–40)
BASOPHILS # BLD AUTO: 0.05 10*3/MM3 (ref 0–0.2)
BASOPHILS NFR BLD AUTO: 0.7 % (ref 0–1.5)
BILIRUB SERPL-MCNC: 0.5 MG/DL (ref 0–1.2)
BUN SERPL-MCNC: 18 MG/DL (ref 8–23)
BUN/CREAT SERPL: 17 (ref 7–25)
CALCIUM SPEC-SCNC: 9.4 MG/DL (ref 8.6–10.5)
CHLORIDE SERPL-SCNC: 108 MMOL/L (ref 98–107)
CHOLEST SERPL-MCNC: 106 MG/DL (ref 0–200)
CO2 SERPL-SCNC: 21 MMOL/L (ref 22–29)
CREAT SERPL-MCNC: 1.06 MG/DL (ref 0.76–1.27)
DEPRECATED RDW RBC AUTO: 48.6 FL (ref 37–54)
EGFRCR SERPLBLD CKD-EPI 2021: 76 ML/MIN/1.73
EOSINOPHIL # BLD AUTO: 0.45 10*3/MM3 (ref 0–0.4)
EOSINOPHIL NFR BLD AUTO: 6.1 % (ref 0.3–6.2)
ERYTHROCYTE [DISTWIDTH] IN BLOOD BY AUTOMATED COUNT: 13.1 % (ref 12.3–15.4)
GLOBULIN UR ELPH-MCNC: 2.7 GM/DL
GLUCOSE SERPL-MCNC: 103 MG/DL (ref 65–99)
HBA1C MFR BLD: 5.9 % (ref 4.8–5.6)
HCT VFR BLD AUTO: 44.4 % (ref 37.5–51)
HDLC SERPL-MCNC: 36 MG/DL (ref 40–60)
HGB BLD-MCNC: 15.3 G/DL (ref 13–17.7)
IMM GRANULOCYTES # BLD AUTO: 0.02 10*3/MM3 (ref 0–0.05)
IMM GRANULOCYTES NFR BLD AUTO: 0.3 % (ref 0–0.5)
LDLC SERPL CALC-MCNC: 49 MG/DL (ref 0–100)
LDLC/HDLC SERPL: 1.29 {RATIO}
LYMPHOCYTES # BLD AUTO: 1.79 10*3/MM3 (ref 0.7–3.1)
LYMPHOCYTES NFR BLD AUTO: 24.3 % (ref 19.6–45.3)
MCH RBC QN AUTO: 34.6 PG (ref 26.6–33)
MCHC RBC AUTO-ENTMCNC: 34.5 G/DL (ref 31.5–35.7)
MCV RBC AUTO: 100.5 FL (ref 79–97)
MONOCYTES # BLD AUTO: 0.56 10*3/MM3 (ref 0.1–0.9)
MONOCYTES NFR BLD AUTO: 7.6 % (ref 5–12)
NEUTROPHILS NFR BLD AUTO: 4.5 10*3/MM3 (ref 1.7–7)
NEUTROPHILS NFR BLD AUTO: 61 % (ref 42.7–76)
NRBC BLD AUTO-RTO: 0 /100 WBC (ref 0–0.2)
PLATELET # BLD AUTO: 219 10*3/MM3 (ref 140–450)
PMV BLD AUTO: 10.6 FL (ref 6–12)
POTASSIUM SERPL-SCNC: 4.5 MMOL/L (ref 3.5–5.2)
PROT SERPL-MCNC: 6.8 G/DL (ref 6–8.5)
PSA SERPL-MCNC: 2.79 NG/ML (ref 0–4)
RBC # BLD AUTO: 4.42 10*6/MM3 (ref 4.14–5.8)
SODIUM SERPL-SCNC: 139 MMOL/L (ref 136–145)
TRIGL SERPL-MCNC: 118 MG/DL (ref 0–150)
VLDLC SERPL-MCNC: 21 MG/DL (ref 5–40)
WBC NRBC COR # BLD AUTO: 7.37 10*3/MM3 (ref 3.4–10.8)

## 2024-08-16 PROCEDURE — 80053 COMPREHEN METABOLIC PANEL: CPT

## 2024-08-16 PROCEDURE — G0103 PSA SCREENING: HCPCS

## 2024-08-16 PROCEDURE — 83036 HEMOGLOBIN GLYCOSYLATED A1C: CPT

## 2024-08-16 PROCEDURE — 80061 LIPID PANEL: CPT

## 2024-08-16 PROCEDURE — 36415 COLL VENOUS BLD VENIPUNCTURE: CPT

## 2024-08-16 PROCEDURE — 85025 COMPLETE CBC W/AUTO DIFF WBC: CPT

## 2024-09-11 ENCOUNTER — OFFICE VISIT (OUTPATIENT)
Dept: INTERNAL MEDICINE | Age: 69
End: 2024-09-11
Payer: MEDICARE

## 2024-09-11 VITALS
TEMPERATURE: 98.2 F | DIASTOLIC BLOOD PRESSURE: 82 MMHG | WEIGHT: 227 LBS | HEART RATE: 77 BPM | OXYGEN SATURATION: 98 % | SYSTOLIC BLOOD PRESSURE: 144 MMHG | BODY MASS INDEX: 33.62 KG/M2 | HEIGHT: 69 IN

## 2024-09-11 DIAGNOSIS — I65.23 CAROTID STENOSIS, ASYMPTOMATIC, BILATERAL: ICD-10-CM

## 2024-09-11 DIAGNOSIS — Z12.5 SCREENING PSA (PROSTATE SPECIFIC ANTIGEN): ICD-10-CM

## 2024-09-11 DIAGNOSIS — E55.9 VITAMIN D DEFICIENCY: ICD-10-CM

## 2024-09-11 DIAGNOSIS — I21.4 NON-STEMI (NON-ST ELEVATED MYOCARDIAL INFARCTION): ICD-10-CM

## 2024-09-11 DIAGNOSIS — E29.1 HYPOGONADISM IN MALE: ICD-10-CM

## 2024-09-11 DIAGNOSIS — Z00.00 MEDICARE ANNUAL WELLNESS VISIT, SUBSEQUENT: Primary | ICD-10-CM

## 2024-09-11 DIAGNOSIS — R73.01 IFG (IMPAIRED FASTING GLUCOSE): ICD-10-CM

## 2024-09-11 DIAGNOSIS — E78.2 MIXED HYPERLIPIDEMIA: ICD-10-CM

## 2024-09-11 DIAGNOSIS — I10 ESSENTIAL HYPERTENSION: ICD-10-CM

## 2024-09-11 PROCEDURE — 3079F DIAST BP 80-89 MM HG: CPT | Performed by: INTERNAL MEDICINE

## 2024-09-11 PROCEDURE — G0439 PPPS, SUBSEQ VISIT: HCPCS | Performed by: INTERNAL MEDICINE

## 2024-09-11 PROCEDURE — 3077F SYST BP >= 140 MM HG: CPT | Performed by: INTERNAL MEDICINE

## 2024-09-11 PROCEDURE — 3044F HG A1C LEVEL LT 7.0%: CPT | Performed by: INTERNAL MEDICINE

## 2024-09-11 PROCEDURE — 1170F FXNL STATUS ASSESSED: CPT | Performed by: INTERNAL MEDICINE

## 2024-09-11 PROCEDURE — 99214 OFFICE O/P EST MOD 30 MIN: CPT | Performed by: INTERNAL MEDICINE

## 2024-09-11 NOTE — PROGRESS NOTES
"CHIEF COMPLAINT/ HPI:  Medicare Wellness-subsequent (Wellness, Lab follow up. Rt foot bump on bottom of foot , pt states it has been there several weeks. After eating meals , pt states he falls asleep / napping. Pt states no energy/ difficulty with motivation. )              Objective   Vital Signs  Vitals:    09/11/24 1341   BP: 144/82   Pulse: 77   Temp: 98.2 °F (36.8 °C)   SpO2: 98%   Weight: 103 kg (227 lb)   Height: 175.3 cm (69.02\")      Body mass index is 33.51 kg/m².  Review of Systems   Constitutional: Negative.    HENT: Negative.     Eyes: Negative.    Respiratory: Negative.     Cardiovascular: Negative.    Gastrointestinal: Negative.    Endocrine: Negative.    Genitourinary: Negative.    Musculoskeletal: Negative.    Allergic/Immunologic: Negative.    Neurological: Negative.    Hematological: Negative.    Psychiatric/Behavioral: Negative.        Physical Exam  Constitutional:       General: He is not in acute distress.     Appearance: Normal appearance.   HENT:      Head: Normocephalic.      Mouth/Throat:      Mouth: Mucous membranes are moist.   Eyes:      Conjunctiva/sclera: Conjunctivae normal.      Pupils: Pupils are equal, round, and reactive to light.   Cardiovascular:      Rate and Rhythm: Normal rate and regular rhythm.      Pulses: Normal pulses.      Heart sounds: Normal heart sounds.   Pulmonary:      Effort: Pulmonary effort is normal.      Breath sounds: Normal breath sounds.   Abdominal:      General: Bowel sounds are normal.      Palpations: Abdomen is soft.   Musculoskeletal:         General: Tenderness (r plantar tendon nodule --) present. No swelling. Normal range of motion.      Cervical back: Neck supple.   Skin:     General: Skin is warm and dry.      Coloration: Skin is not jaundiced.   Neurological:      General: No focal deficit present.      Mental Status: He is alert and oriented to person, place, and time. Mental status is at baseline.   Psychiatric:         Mood and Affect: " "Mood normal.         Behavior: Behavior normal.         Thought Content: Thought content normal.         Judgment: Judgment normal.        Result Review :   Lab Results   Component Value Date    PROBNP 2,625.0 (H) 11/08/2022     CMP          2/15/2024    14:26 8/16/2024    11:14   CMP   Glucose 100  103    BUN 24  18    Creatinine 1.09  1.06    EGFR 73.5  76.0    Sodium 141  139    Potassium 4.8  4.5    Chloride 107  108    Calcium 9.0  9.4    Total Protein 6.8  6.8    Albumin 4.3  4.1    Globulin 2.5  2.7    Total Bilirubin 0.4  0.5    Alkaline Phosphatase 58  54    AST (SGOT) 30  25    ALT (SGPT) 38  30    Albumin/Globulin Ratio 1.7  1.5    BUN/Creatinine Ratio 22.0  17.0    Anion Gap 10.8  10.0      CBC w/diff          2/15/2024    14:26 8/16/2024    11:14   CBC w/Diff   WBC 6.30  7.37    RBC 4.46  4.42    Hemoglobin 15.4  15.3    Hematocrit 44.7  44.4    .2  100.5    MCH 34.5  34.6    MCHC 34.5  34.5    RDW 12.7  13.1    Platelets 238  219    Neutrophil Rel % 58.1  61.0    Immature Granulocyte Rel % 0.3  0.3    Lymphocyte Rel % 28.3  24.3    Monocyte Rel % 6.2  7.6    Eosinophil Rel % 6.5  6.1    Basophil Rel % 0.6  0.7       Lipid Panel          2/15/2024    14:26 8/16/2024    11:14   Lipid Panel   Total Cholesterol 108  106    Triglycerides 147  118    HDL Cholesterol 39  36    VLDL Cholesterol 25  21    LDL Cholesterol  44  49    LDL/HDL Ratio 1.02  1.29       Lab Results   Component Value Date    TSH 2.410 07/22/2021    TSH 1.810 07/14/2020      No results found for: \"FREET4\"   A1C Last 3 Results          2/15/2024    14:26 8/16/2024    11:14   HGBA1C Last 3 Results   Hemoglobin A1C 6.20  5.90       PSA          8/16/2024    11:14   PSA   PSA 2.790                     Visit Diagnoses:    ICD-10-CM ICD-9-CM   1. Medicare annual wellness visit, subsequent  Z00.00 V70.0   2. Hypogonadism in male  E29.1 257.2   3. Carotid stenosis, asymptomatic, bilateral  I65.23 433.10     433.30   4. Essential " hypertension  I10 401.9   5. Mixed hyperlipidemia  E78.2 272.2   6. Vitamin D deficiency  E55.9 268.9   7. Screening PSA (prostate specific antigen)  Z12.5 V76.44   8. Non-STEMI (non-ST elevated myocardial infarction)  I21.4 410.70   9. IFG (impaired fasting glucose)  R73.01 790.21       Assessment and Plan   Diagnoses and all orders for this visit:    1. Medicare annual wellness visit, subsequent (Primary)  -     Vitamin B12 anemia; Future  -     Folate anemia; Future  -     Vitamin D,25-Hydroxy; Future  -     CBC & Differential; Future  -     Comprehensive Metabolic Panel; Future  -     Hemoglobin A1c; Future  -     TSH+Free T4; Future    2. Hypogonadism in male  -     Vitamin B12 anemia; Future  -     Folate anemia; Future  -     Vitamin D,25-Hydroxy; Future  -     CBC & Differential; Future  -     Comprehensive Metabolic Panel; Future  -     Hemoglobin A1c; Future  -     TSH+Free T4; Future  -     Testosterone; Future    3. Carotid stenosis, asymptomatic, bilateral  -     Vitamin B12 anemia; Future  -     Folate anemia; Future  -     Vitamin D,25-Hydroxy; Future  -     CBC & Differential; Future  -     Comprehensive Metabolic Panel; Future  -     Hemoglobin A1c; Future  -     TSH+Free T4; Future    4. Essential hypertension  Assessment & Plan:  Hypertension is stable and controlled  Continue current treatment regimen.  Blood pressure will be reassessed in 6 months.    Orders:  -     Vitamin B12 anemia; Future  -     Folate anemia; Future  -     Vitamin D,25-Hydroxy; Future  -     CBC & Differential; Future  -     Comprehensive Metabolic Panel; Future  -     Hemoglobin A1c; Future  -     TSH+Free T4; Future    5. Mixed hyperlipidemia  Assessment & Plan:   Lipid abnormalities are stable    Plan:  Continue same medication/s without change.      Discussed medication dosage, use, side effects, and goals of treatment in detail.    Counseled patient on lifestyle modifications to help control hyperlipidemia.     Patient  Treatment Goals:   LDL goal is less than 70    Followup in 6 months.    Orders:  -     Vitamin B12 anemia; Future  -     Folate anemia; Future  -     Vitamin D,25-Hydroxy; Future  -     CBC & Differential; Future  -     Comprehensive Metabolic Panel; Future  -     Hemoglobin A1c; Future  -     TSH+Free T4; Future    6. Vitamin D deficiency  -     Vitamin B12 anemia; Future  -     Folate anemia; Future  -     Vitamin D,25-Hydroxy; Future  -     CBC & Differential; Future  -     Comprehensive Metabolic Panel; Future  -     Hemoglobin A1c; Future  -     TSH+Free T4; Future    7. Screening PSA (prostate specific antigen)  -     Vitamin B12 anemia; Future  -     Folate anemia; Future  -     Vitamin D,25-Hydroxy; Future  -     CBC & Differential; Future  -     Comprehensive Metabolic Panel; Future  -     Hemoglobin A1c; Future  -     TSH+Free T4; Future    8. Non-STEMI (non-ST elevated myocardial infarction)  Assessment & Plan:  Coronary Artery Disease (OPTIONAL): Coronary artery disease is stable.  Continue current treatment regimen.  Cardiac status will be reassessed in 6 months.    Orders:  -     Vitamin B12 anemia; Future  -     Folate anemia; Future  -     Vitamin D,25-Hydroxy; Future  -     CBC & Differential; Future  -     Comprehensive Metabolic Panel; Future  -     Hemoglobin A1c; Future  -     TSH+Free T4; Future    9. IFG (impaired fasting glucose)  -     Vitamin B12 anemia; Future  -     Folate anemia; Future  -     Vitamin D,25-Hydroxy; Future  -     CBC & Differential; Future  -     Comprehensive Metabolic Panel; Future  -     Hemoglobin A1c; Future  -     TSH+Free T4; Future        Medicare annual wellness questionnaire completed September 11, 2024     Hypertension, continues lisinopril 40 mg daily    Chest pressure chest tightness questionable, November during a stressful event, ended up going to the emergency room, November, 2022, was referred to cardiology, stress test --- January 24, 2023,, left heart  cath showed minimal coronary artery disease normal ejection fraction 55%, mild luminal regularities, chest x-ray November 2022 no active disease,    Hemorrhoids,  to Dr. Randhawa general surgery, August 3, 2022    Anxiety/ stress, fibromyalgia--continues Cymbalta 60 mg daily,    Mild vitamin D3, B12, folate deficiencies continues counter supplements    Impaired fasting glucose,/ dm 2 --- hemoglobin A1c 6.2--up slightly, February 2024, down to 5.9 August 16, 2024    Carotid stenosis right artery--80-99, -% feb 2018, - S/P R CEA surgery August 29, 2019 in [place] ----cardiac stress testing August 21 in [place] and echocardiogram------dr loren lowery at [facility]--   Cont plavix ---, Follow-up CAROTIDS,  jUNE 2020 WITH 50% STENOSIS, right-sided stable July 2020, follow-up with vascular surgery, --continues yearly follow-up     Heart murmur, questionable previous echo August 2019 showed normal ejection fraction, no significant valve abnormalities reported, consider repeating test at some point    Low testosterone== 143, July 2021, -- PSA at 2.1 July 2022     CAD, REMOTE MI--    Mixed hyperlipidemia, -continue-lipitor 20 mg qd ,Zetia -    OLD STROKEs- 2004-MILD COG IMPAIRMENT, L UE WEAKNESS,? 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  Patient was given instructions and counseling regarding his condition or for health maintenance advice. Please see specific information pulled into the AVS if appropriate.

## 2024-09-11 NOTE — PROGRESS NOTES
Subjective   The ABCs of the Annual Wellness Visit  Medicare Wellness Visit      Aram Sarmiento is a 68 y.o. patient who presents for a Medicare Wellness Visit.    The following portions of the patient's history were reviewed and   updated as appropriate: allergies, current medications, past family history, past medical history, past social history, past surgical history, and problem list.    Compared to one year ago, the patient's physical   health is the same.  Compared to one year ago, the patient's mental   health is worse.    Recent Hospitalizations:  He was not admitted to the hospital during the last year.     Current Medical Providers:  Patient Care Team:  Rolando Darling MD as PCP - General (Internal Medicine)  Flavio Randhawa MD as Consulting Physician (General Surgery)    Outpatient Medications Prior to Visit   Medication Sig Dispense Refill    atorvastatin (LIPITOR) 20 MG tablet TAKE 1 TABLET BY MOUTH EVERY NIGHT 90 tablet 1    clopidogrel (PLAVIX) 75 MG tablet TAKE 1 TABLET BY MOUTH EVERY DAY 90 tablet 1    DULoxetine (CYMBALTA) 60 MG capsule Take 1 capsule by mouth Every Night. 90 capsule 1    ezetimibe (ZETIA) 10 MG tablet TAKE 1 TABLET BY MOUTH DAILY 90 tablet 3    lisinopril (PRINIVIL,ZESTRIL) 40 MG tablet TAKE 1 TABLET BY MOUTH DAILY 90 tablet 3    nitroglycerin (NITROSTAT) 0.4 MG SL tablet Place 1 tablet under the tongue Every 5 (Five) Minutes As Needed for Chest Pain. 12 tablet 0    vitamin B-12 (CYANOCOBALAMIN) 1000 MCG tablet Take 1 tablet by mouth Every Night.       No facility-administered medications prior to visit.     No opioid medication identified on active medication list. I have reviewed chart for other potential  high risk medication/s and harmful drug interactions in the elderly.      Aspirin is not on active medication list.  Aspirin use is not indicated based on review of current medical condition/s. Risk of harm outweighs potential benefits.  .    Patient Active  "Problem List   Diagnosis    Screening PSA (prostate specific antigen)    Essential hypertension    Mixed hyperlipidemia    H/O: CVA (cerebrovascular accident)    Stenosis of right carotid artery    Carotid stenosis, asymptomatic, bilateral    Hypogonadism in male    Vitamin D deficiency    Diabetes 1.5, managed as type 2    Type 2 diabetes mellitus without complication, without long-term current use of insulin    Chest discomfort    Non-STEMI (non-ST elevated myocardial infarction)    Medicare annual wellness visit, subsequent    IFG (impaired fasting glucose)     Advance Care Planning Advance Directive is on file.  ACP discussion was held with the patient during this visit. Patient has an advance directive in EMR which is still valid.             Objective   Vitals:    09/11/24 1341   BP: 144/82   Pulse: 77   Temp: 98.2 °F (36.8 °C)   SpO2: 98%   Weight: 103 kg (227 lb)   Height: 175.3 cm (69.02\")       Estimated body mass index is 33.51 kg/m² as calculated from the following:    Height as of this encounter: 175.3 cm (69.02\").    Weight as of this encounter: 103 kg (227 lb).    BMI is >= 30 and <35. (Class 1 Obesity). The following options were offered after discussion;: weight loss educational material (shared in after visit summary), exercise counseling/recommendations, and nutrition counseling/recommendations       Does the patient have evidence of cognitive impairment? No  Lab Results   Component Value Date    TRIG 118 08/16/2024    HDL 36 (L) 08/16/2024    LDL 49 08/16/2024    VLDL 21 08/16/2024    HGBA1C 5.90 (H) 08/16/2024                                                                                               Health  Risk Assessment    Smoking Status:  Social History     Tobacco Use   Smoking Status Every Day    Current packs/day: 1.00    Average packs/day: 1 pack/day for 50.0 years (50.0 ttl pk-yrs)    Types: Cigarettes   Smokeless Tobacco Never     Alcohol Consumption:  Social History     Substance " and Sexual Activity   Alcohol Use Yes    Alcohol/week: 3.0 standard drinks of alcohol    Types: 3 Standard drinks or equivalent per week    Comment: q 2 weekly        Fall Risk Screen  KENAADI Fall Risk Assessment was completed, and patient is at LOW risk for falls.Assessment completed on:2024    Depression Screenin/11/2024     1:53 PM   PHQ-2/PHQ-9 Depression Screening   Little Interest or Pleasure in Doing Things 0-->not at all   Feeling Down, Depressed or Hopeless 0-->not at all   PHQ-9: Brief Depression Severity Measure Score 0     Health Habits and Functional and Cognitive Screenin/11/2024     1:00 PM   Functional & Cognitive Status   Do you have difficulty preparing food and eating? No   Do you have difficulty bathing yourself, getting dressed or grooming yourself? No   Do you have difficulty using the toilet? No   Do you have difficulty moving around from place to place? No   Do you have trouble with steps or getting out of a bed or a chair? No   Current Diet Well Balanced Diet   Dental Exam Up to date   Eye Exam Not up to date   Exercise (times per week) 2 times per week   Current Exercises Include Yard Work   Do you need help using the phone?  No   Are you deaf or do you have serious difficulty hearing?  Yes   Do you need help to go to places out of walking distance? No   Do you need help shopping? No   Do you need help preparing meals?  No   Do you need help with housework?  No   Do you need help with laundry? No   Do you need help taking your medications? No   Do you need help managing money? No   Do you ever drive or ride in a car without wearing a seat belt? No   Have you felt unusual stress, anger or loneliness in the last month? No   Who do you live with? Spouse   If you need help, do you have trouble finding someone available to you? No   Have you been bothered in the last four weeks by sexual problems? No   Do you have difficulty concentrating, remembering or making decisions?  No           Age-appropriate Screening Schedule:  Refer to the list below for future screening recommendations based on patient's age, sex and/or medical conditions. Orders for these recommended tests are listed in the plan section. The patient has been provided with a written plan.    Health Maintenance List  Health Maintenance   Topic Date Due    URINE MICROALBUMIN  Never done    COLORECTAL CANCER SCREENING  Never done    DIABETIC EYE EXAM  Never done    ZOSTER VACCINE (1 of 2) Never done    LUNG CANCER SCREENING  Never done    HEPATITIS C SCREENING  Never done    DIABETIC FOOT EXAM  Never done    AAA SCREEN (ONE-TIME)  Never done    BMI FOLLOWUP  08/14/2024    COVID-19 Vaccine (4 - 2023-24 season) 09/01/2024    INFLUENZA VACCINE  08/01/2024    Pneumococcal Vaccine 65+ (1 of 2 - PCV) 02/19/2025 (Originally 12/11/1961)    TDAP/TD VACCINES (1 - Tdap) 02/19/2025 (Originally 12/11/1974)    HEMOGLOBIN A1C  02/16/2025    LIPID PANEL  08/16/2025    ANNUAL WELLNESS VISIT  09/11/2025                                                                                                                                                CMS Preventative Services Quick Reference  Risk Factors Identified During Encounter  Tobacco Use/Dependance Risk (use dotphrase .tobaccocessation for documentation)    The above risks/problems have been discussed with the patient.  Pertinent information has been shared with the patient in the After Visit Summary.  An After Visit Summary and PPPS were made available to the patient.    Follow Up:   Next Medicare Wellness visit to be scheduled in 1 year.     Assessment & Plan  Hypogonadism in male    Carotid stenosis, asymptomatic, bilateral    Essential hypertension  Hypertension is stable and controlled  Continue current treatment regimen.  Blood pressure will be reassessed in 6 months.  Mixed hyperlipidemia   Lipid abnormalities are stable    Plan:  Continue same medication/s without change.       Discussed medication dosage, use, side effects, and goals of treatment in detail.    Counseled patient on lifestyle modifications to help control hyperlipidemia.     Patient Treatment Goals:   LDL goal is less than 70    Followup in 6 months.  Vitamin D deficiency    Screening PSA (prostate specific antigen)    Medicare annual wellness visit, subsequent    Non-STEMI (non-ST elevated myocardial infarction)  Coronary Artery Disease (OPTIONAL): Coronary artery disease is stable.  Continue current treatment regimen.  Cardiac status will be reassessed in 6 months.  IFG (impaired fasting glucose)    Orders Placed This Encounter   Procedures    Vitamin B12 anemia    Folate anemia    Vitamin D,25-Hydroxy    Comprehensive Metabolic Panel    Hemoglobin A1c    TSH+Free T4    CBC & Differential             Follow Up:   No follow-ups on file.

## 2024-09-20 RX ORDER — CLOPIDOGREL BISULFATE 75 MG/1
TABLET ORAL
Qty: 90 TABLET | Refills: 1 | Status: SHIPPED | OUTPATIENT
Start: 2024-09-20

## 2024-11-23 DIAGNOSIS — E78.2 MIXED HYPERLIPIDEMIA: ICD-10-CM

## 2024-11-25 RX ORDER — ATORVASTATIN CALCIUM 20 MG/1
20 TABLET, FILM COATED ORAL NIGHTLY
Qty: 90 TABLET | Refills: 1 | Status: SHIPPED | OUTPATIENT
Start: 2024-11-25

## 2024-12-23 RX ORDER — LISINOPRIL 40 MG/1
40 TABLET ORAL DAILY
Qty: 90 TABLET | Refills: 3 | Status: SHIPPED | OUTPATIENT
Start: 2024-12-23

## 2025-03-12 RX ORDER — DULOXETIN HYDROCHLORIDE 60 MG/1
60 CAPSULE, DELAYED RELEASE ORAL NIGHTLY
Qty: 90 CAPSULE | Refills: 1 | Status: SHIPPED | OUTPATIENT
Start: 2025-03-12

## 2025-04-01 RX ORDER — CLOPIDOGREL BISULFATE 75 MG/1
75 TABLET ORAL DAILY
Qty: 90 TABLET | Refills: 1 | Status: SHIPPED | OUTPATIENT
Start: 2025-04-01

## 2025-04-16 ENCOUNTER — LAB (OUTPATIENT)
Dept: LAB | Facility: HOSPITAL | Age: 70
End: 2025-04-16
Payer: MEDICARE

## 2025-04-16 DIAGNOSIS — I10 ESSENTIAL HYPERTENSION: ICD-10-CM

## 2025-04-16 DIAGNOSIS — Z00.00 MEDICARE ANNUAL WELLNESS VISIT, SUBSEQUENT: ICD-10-CM

## 2025-04-16 DIAGNOSIS — E29.1 HYPOGONADISM IN MALE: ICD-10-CM

## 2025-04-16 DIAGNOSIS — Z12.5 SCREENING PSA (PROSTATE SPECIFIC ANTIGEN): ICD-10-CM

## 2025-04-16 DIAGNOSIS — E78.2 MIXED HYPERLIPIDEMIA: ICD-10-CM

## 2025-04-16 DIAGNOSIS — I65.23 CAROTID STENOSIS, ASYMPTOMATIC, BILATERAL: ICD-10-CM

## 2025-04-16 DIAGNOSIS — E55.9 VITAMIN D DEFICIENCY: ICD-10-CM

## 2025-04-16 DIAGNOSIS — I21.4 NON-STEMI (NON-ST ELEVATED MYOCARDIAL INFARCTION): ICD-10-CM

## 2025-04-16 DIAGNOSIS — R73.01 IFG (IMPAIRED FASTING GLUCOSE): ICD-10-CM

## 2025-04-16 LAB
25(OH)D3 SERPL-MCNC: 19.3 NG/ML (ref 30–100)
ALBUMIN SERPL-MCNC: 4.2 G/DL (ref 3.5–5.2)
ALBUMIN/GLOB SERPL: 1.4 G/DL
ALP SERPL-CCNC: 61 U/L (ref 39–117)
ALT SERPL W P-5'-P-CCNC: 34 U/L (ref 1–41)
ANION GAP SERPL CALCULATED.3IONS-SCNC: 11.1 MMOL/L (ref 5–15)
AST SERPL-CCNC: 28 U/L (ref 1–40)
BASOPHILS # BLD AUTO: 0.05 10*3/MM3 (ref 0–0.2)
BASOPHILS NFR BLD AUTO: 0.6 % (ref 0–1.5)
BILIRUB SERPL-MCNC: 0.4 MG/DL (ref 0–1.2)
BUN SERPL-MCNC: 23 MG/DL (ref 8–23)
BUN/CREAT SERPL: 18 (ref 7–25)
CALCIUM SPEC-SCNC: 9.8 MG/DL (ref 8.6–10.5)
CHLORIDE SERPL-SCNC: 104 MMOL/L (ref 98–107)
CO2 SERPL-SCNC: 23.9 MMOL/L (ref 22–29)
CREAT SERPL-MCNC: 1.28 MG/DL (ref 0.76–1.27)
DEPRECATED RDW RBC AUTO: 49.1 FL (ref 37–54)
EGFRCR SERPLBLD CKD-EPI 2021: 60.2 ML/MIN/1.73
EOSINOPHIL # BLD AUTO: 0.42 10*3/MM3 (ref 0–0.4)
EOSINOPHIL NFR BLD AUTO: 5.1 % (ref 0.3–6.2)
ERYTHROCYTE [DISTWIDTH] IN BLOOD BY AUTOMATED COUNT: 13.1 % (ref 12.3–15.4)
FOLATE SERPL-MCNC: 13.5 NG/ML (ref 4.78–24.2)
GLOBULIN UR ELPH-MCNC: 3.1 GM/DL
GLUCOSE SERPL-MCNC: 109 MG/DL (ref 65–99)
HBA1C MFR BLD: 6.3 % (ref 4.8–5.6)
HCT VFR BLD AUTO: 48.7 % (ref 37.5–51)
HGB BLD-MCNC: 16.4 G/DL (ref 13–17.7)
IMM GRANULOCYTES # BLD AUTO: 0.04 10*3/MM3 (ref 0–0.05)
IMM GRANULOCYTES NFR BLD AUTO: 0.5 % (ref 0–0.5)
LYMPHOCYTES # BLD AUTO: 1.83 10*3/MM3 (ref 0.7–3.1)
LYMPHOCYTES NFR BLD AUTO: 22.4 % (ref 19.6–45.3)
MCH RBC QN AUTO: 34.2 PG (ref 26.6–33)
MCHC RBC AUTO-ENTMCNC: 33.7 G/DL (ref 31.5–35.7)
MCV RBC AUTO: 101.7 FL (ref 79–97)
MONOCYTES # BLD AUTO: 0.58 10*3/MM3 (ref 0.1–0.9)
MONOCYTES NFR BLD AUTO: 7.1 % (ref 5–12)
NEUTROPHILS NFR BLD AUTO: 5.25 10*3/MM3 (ref 1.7–7)
NEUTROPHILS NFR BLD AUTO: 64.3 % (ref 42.7–76)
NRBC BLD AUTO-RTO: 0 /100 WBC (ref 0–0.2)
PLATELET # BLD AUTO: 242 10*3/MM3 (ref 140–450)
PMV BLD AUTO: 10.7 FL (ref 6–12)
POTASSIUM SERPL-SCNC: 4.8 MMOL/L (ref 3.5–5.2)
PROT SERPL-MCNC: 7.3 G/DL (ref 6–8.5)
RBC # BLD AUTO: 4.79 10*6/MM3 (ref 4.14–5.8)
SODIUM SERPL-SCNC: 139 MMOL/L (ref 136–145)
T4 FREE SERPL-MCNC: 0.9 NG/DL (ref 0.92–1.68)
TESTOST SERPL-MCNC: 186 NG/DL (ref 193–740)
TSH SERPL DL<=0.05 MIU/L-ACNC: 1.89 UIU/ML (ref 0.27–4.2)
VIT B12 BLD-MCNC: 812 PG/ML (ref 211–946)
WBC NRBC COR # BLD AUTO: 8.17 10*3/MM3 (ref 3.4–10.8)

## 2025-04-16 PROCEDURE — 84443 ASSAY THYROID STIM HORMONE: CPT

## 2025-04-16 PROCEDURE — 84403 ASSAY OF TOTAL TESTOSTERONE: CPT

## 2025-04-16 PROCEDURE — 82607 VITAMIN B-12: CPT

## 2025-04-16 PROCEDURE — 36415 COLL VENOUS BLD VENIPUNCTURE: CPT

## 2025-04-16 PROCEDURE — 82746 ASSAY OF FOLIC ACID SERUM: CPT

## 2025-04-16 PROCEDURE — 85025 COMPLETE CBC W/AUTO DIFF WBC: CPT

## 2025-04-16 PROCEDURE — 82306 VITAMIN D 25 HYDROXY: CPT

## 2025-04-16 PROCEDURE — 83036 HEMOGLOBIN GLYCOSYLATED A1C: CPT

## 2025-04-16 PROCEDURE — 80053 COMPREHEN METABOLIC PANEL: CPT

## 2025-04-16 PROCEDURE — 84439 ASSAY OF FREE THYROXINE: CPT

## 2025-04-21 ENCOUNTER — OFFICE VISIT (OUTPATIENT)
Dept: INTERNAL MEDICINE | Age: 70
End: 2025-04-21
Payer: MEDICARE

## 2025-04-21 VITALS
HEIGHT: 69 IN | SYSTOLIC BLOOD PRESSURE: 144 MMHG | HEART RATE: 66 BPM | BODY MASS INDEX: 33.83 KG/M2 | WEIGHT: 228.4 LBS | DIASTOLIC BLOOD PRESSURE: 68 MMHG | OXYGEN SATURATION: 95 % | TEMPERATURE: 98 F

## 2025-04-21 DIAGNOSIS — E29.1 HYPOGONADISM IN MALE: ICD-10-CM

## 2025-04-21 DIAGNOSIS — R73.01 IFG (IMPAIRED FASTING GLUCOSE): ICD-10-CM

## 2025-04-21 DIAGNOSIS — Z12.5 SCREENING PSA (PROSTATE SPECIFIC ANTIGEN): ICD-10-CM

## 2025-04-21 DIAGNOSIS — I65.23 CAROTID STENOSIS, ASYMPTOMATIC, BILATERAL: ICD-10-CM

## 2025-04-21 DIAGNOSIS — E11.9 TYPE 2 DIABETES MELLITUS WITHOUT COMPLICATION, WITHOUT LONG-TERM CURRENT USE OF INSULIN: Primary | ICD-10-CM

## 2025-04-21 DIAGNOSIS — E55.9 VITAMIN D DEFICIENCY: ICD-10-CM

## 2025-04-21 DIAGNOSIS — I65.21 STENOSIS OF RIGHT CAROTID ARTERY: ICD-10-CM

## 2025-04-21 DIAGNOSIS — Z86.73 H/O: CVA (CEREBROVASCULAR ACCIDENT): ICD-10-CM

## 2025-04-21 DIAGNOSIS — I10 ESSENTIAL HYPERTENSION: ICD-10-CM

## 2025-04-21 DIAGNOSIS — E78.2 MIXED HYPERLIPIDEMIA: ICD-10-CM

## 2025-04-21 RX ORDER — SEMAGLUTIDE 0.68 MG/ML
0.25 INJECTION, SOLUTION SUBCUTANEOUS WEEKLY
Qty: 3 ML | Refills: 2 | Status: SHIPPED | OUTPATIENT
Start: 2025-04-21

## 2025-04-21 NOTE — PROGRESS NOTES
"Chief Complaint   Patient presents with    Hypertension     Pt here for follow up pt has been experiencing lots of fatigue and has been just eating and sleeping    Patient is here for his routine 6-month follow-up from his Medicare wellness visit 6 months ago, he is doing okay, he has multiple care gaps issues that could be addressed and we discussed those briefly today,    Objective   Vital Signs  Vitals:    04/21/25 1246   BP: 144/68   Pulse: 66   Temp: 98 °F (36.7 °C)   SpO2: 95%   Weight: 104 kg (228 lb 6.4 oz)   Height: 175.3 cm (69.02\")      Body mass index is 33.71 kg/m².  Review of Systems   Constitutional: Negative.    HENT: Negative.     Eyes: Negative.    Respiratory: Negative.     Cardiovascular: Negative.    Gastrointestinal: Negative.    Endocrine: Negative.    Genitourinary: Negative.    Musculoskeletal: Negative.    Allergic/Immunologic: Negative.    Neurological: Negative.    Hematological: Negative.    Psychiatric/Behavioral: Negative.        Physical Exam  Constitutional:       General: He is not in acute distress.     Appearance: Normal appearance.   HENT:      Head: Normocephalic.      Mouth/Throat:      Mouth: Mucous membranes are moist.   Eyes:      Conjunctiva/sclera: Conjunctivae normal.      Pupils: Pupils are equal, round, and reactive to light.   Cardiovascular:      Rate and Rhythm: Normal rate and regular rhythm.      Pulses: Normal pulses.      Heart sounds: Normal heart sounds.   Pulmonary:      Effort: Pulmonary effort is normal.      Breath sounds: Normal breath sounds.   Abdominal:      General: Bowel sounds are normal.      Palpations: Abdomen is soft.   Musculoskeletal:         General: No swelling. Normal range of motion.      Cervical back: Neck supple.   Skin:     General: Skin is warm and dry.      Coloration: Skin is not jaundiced.   Neurological:      General: No focal deficit present.      Mental Status: He is alert and oriented to person, place, and time. Mental status is " at baseline.   Psychiatric:         Mood and Affect: Mood normal.         Behavior: Behavior normal.         Thought Content: Thought content normal.         Judgment: Judgment normal.        Result Review :   Lab Results   Component Value Date    PROBNP 2,625.0 (H) 11/08/2022     CMP          8/16/2024    11:14 4/16/2025    11:14   CMP   Glucose 103  109    BUN 18  23    Creatinine 1.06  1.28    EGFR 76.0  60.2    Sodium 139  139    Potassium 4.5  4.8    Chloride 108  104    Calcium 9.4  9.8    Total Protein 6.8  7.3    Albumin 4.1  4.2    Globulin 2.7  3.1    Total Bilirubin 0.5  0.4    Alkaline Phosphatase 54  61    AST (SGOT) 25  28    ALT (SGPT) 30  34    Albumin/Globulin Ratio 1.5  1.4    BUN/Creatinine Ratio 17.0  18.0    Anion Gap 10.0  11.1      CBC w/diff          8/16/2024    11:14 4/16/2025    11:14   CBC w/Diff   WBC 7.37  8.17    RBC 4.42  4.79    Hemoglobin 15.3  16.4    Hematocrit 44.4  48.7    .5  101.7    MCH 34.6  34.2    MCHC 34.5  33.7    RDW 13.1  13.1    Platelets 219  242    Neutrophil Rel % 61.0  64.3    Immature Granulocyte Rel % 0.3  0.5    Lymphocyte Rel % 24.3  22.4    Monocyte Rel % 7.6  7.1    Eosinophil Rel % 6.1  5.1    Basophil Rel % 0.7  0.6       Lipid Panel          8/16/2024    11:14   Lipid Panel   Total Cholesterol 106    Triglycerides 118    HDL Cholesterol 36    VLDL Cholesterol 21    LDL Cholesterol  49    LDL/HDL Ratio 1.29       Lab Results   Component Value Date    TSH 1.890 04/16/2025    TSH 2.410 07/22/2021    TSH 1.810 07/14/2020      Lab Results   Component Value Date    FREET4 0.90 (L) 04/16/2025      A1C Last 3 Results          8/16/2024    11:14 4/16/2025    11:14   HGBA1C Last 3 Results   Hemoglobin A1C 5.90  6.30       PSA          8/16/2024    11:14   PSA   PSA 2.790                     Visit Diagnoses:    ICD-10-CM ICD-9-CM   1. Type 2 diabetes mellitus without complication, without long-term current use of insulin  E11.9 250.00   2. Essential  hypertension  I10 401.9   3. Vitamin D deficiency  E55.9 268.9   4. IFG (impaired fasting glucose)  R73.01 790.21   5. H/O: CVA (cerebrovascular accident)  Z86.73 V12.54   6. Stenosis of right carotid artery  I65.21 433.10   7. Mixed hyperlipidemia  E78.2 272.2   8. Carotid stenosis, asymptomatic, bilateral  I65.23 433.10     433.30   9. Hypogonadism in male  E29.1 257.2   10. Screening PSA (prostate specific antigen)  Z12.5 V76.44       Assessment and Plan   Diagnoses and all orders for this visit:    1. Type 2 diabetes mellitus without complication, without long-term current use of insulin (Primary)  -     CBC & Differential; Future  -     Comprehensive Metabolic Panel; Future  -     Hemoglobin A1c; Future  -     Lipid Panel; Future  -     TSH+Free T4; Future  -     PSA Screen; Future    2. Essential hypertension  -     CBC & Differential; Future  -     Comprehensive Metabolic Panel; Future  -     Hemoglobin A1c; Future  -     Lipid Panel; Future  -     TSH+Free T4; Future  -     PSA Screen; Future    3. Vitamin D deficiency  -     CBC & Differential; Future  -     Comprehensive Metabolic Panel; Future  -     Hemoglobin A1c; Future  -     Lipid Panel; Future  -     TSH+Free T4; Future  -     PSA Screen; Future    4. IFG (impaired fasting glucose)  -     CBC & Differential; Future  -     Comprehensive Metabolic Panel; Future  -     Hemoglobin A1c; Future  -     Lipid Panel; Future  -     TSH+Free T4; Future  -     PSA Screen; Future    5. H/O: CVA (cerebrovascular accident)  -     CBC & Differential; Future  -     Comprehensive Metabolic Panel; Future  -     Hemoglobin A1c; Future  -     Lipid Panel; Future  -     TSH+Free T4; Future  -     PSA Screen; Future    6. Stenosis of right carotid artery  -     CBC & Differential; Future  -     Comprehensive Metabolic Panel; Future  -     Hemoglobin A1c; Future  -     Lipid Panel; Future  -     TSH+Free T4; Future  -     PSA Screen; Future    7. Mixed hyperlipidemia  -      CBC & Differential; Future  -     Comprehensive Metabolic Panel; Future  -     Hemoglobin A1c; Future  -     Lipid Panel; Future  -     TSH+Free T4; Future  -     PSA Screen; Future    8. Carotid stenosis, asymptomatic, bilateral  -     CBC & Differential; Future  -     Comprehensive Metabolic Panel; Future  -     Hemoglobin A1c; Future  -     Lipid Panel; Future  -     TSH+Free T4; Future  -     PSA Screen; Future    9. Hypogonadism in male  -     CBC & Differential; Future  -     Comprehensive Metabolic Panel; Future  -     Hemoglobin A1c; Future  -     Lipid Panel; Future  -     TSH+Free T4; Future  -     PSA Screen; Future    10. Screening PSA (prostate specific antigen)  -     CBC & Differential; Future  -     Comprehensive Metabolic Panel; Future  -     Hemoglobin A1c; Future  -     Lipid Panel; Future  -     TSH+Free T4; Future  -     PSA Screen; Future    Other orders  -     Semaglutide,0.25 or 0.5MG/DOS, (Ozempic, 0.25 or 0.5 MG/DOSE,) 2 MG/3ML solution pen-injector; Inject 0.25 mg under the skin into the appropriate area as directed 1 (One) Time Per Week.  Dispense: 3 mL; Refill: 2        Medicare annual wellness questionnaire completed September 11, 2024     Hypertension, continues lisinopril 40 mg daily    Weight loss efforts, discussed use of zepbound, Wegovy, April 21, 2025, will send in and see if it is covered    Chest pressure chest tightness questionable, November during a stressful event, ended up going to the emergency room, November, 2022, was referred to cardiology, stress test --- January 24, 2023,, left heart cath showed minimal coronary artery disease normal ejection fraction 55%, mild luminal regularities, chest x-ray November 2022 no active disease,    Hemorrhoids, Dr. Randhawa general surgery, August 3, 2022    Anxiety/ stress, fibromyalgia--continues Cymbalta 60 mg daily,    Mild vitamin D3, B12, folate deficiencies continues counter supplements    Impaired fasting glucose,/ dm 2 ---  hemoglobin A1c 6.2--up slightly, February 2024, down to 5.9 August 16, 2024    Carotid stenosis right artery--80-99, -% feb 2018, - S/P R CEA surgery August 29, 2019 in [place] ----cardiac stress testing August 21 in [place] and echocardiogram------dr loren lowery at [facility]--   Cont plavix ---, Follow-up CAROTIDS,  jUNE 2020 WITH 50% STENOSIS, right-sided stable July 2020, follow-up with vascular surgery, --continues yearly follow-up     Heart murmur, questionable previous echo August 2019 showed normal ejection fraction, no significant valve abnormalities reported, consider repeating test at some point    Low testosterone== 186 April 16, 2025, -- PSA 2.7 August 16, 2024     CAD, REMOTE MI--    Mixed hyperlipidemia, -continue-lipitor 20 mg qd ,Zetia -    OLD STROKEs- 2004-MILD COG IMPAIRMENT, L UE WEAKNESS,? VERY MILD INTERMITTENT DYSPHAGIA    LUMBAR- CERVICAL DJD-- PRIOR LUMBAR DISC SURGERY    C SCOPE 12/15, DUSTY, 2 X POLYPS    Ultrasound aorta -- vascular surgery clinic in [place]----- NO significant aneurysm August 2019,    DISCUSSED LOW DOSE CT SCREENING FOR LUNG CA SCREENING feb 2022    PSA 2.7, August 8, 2024                 Follow Up   Return in about 6 months (around 10/21/2025).  Patient was given instructions and counseling regarding his condition or for health maintenance advice. Please see specific information pulled into the AVS if appropriate.

## 2025-04-25 ENCOUNTER — TELEPHONE (OUTPATIENT)
Dept: INTERNAL MEDICINE | Age: 70
End: 2025-04-25
Payer: MEDICARE

## 2025-04-25 DIAGNOSIS — E11.9 TYPE 2 DIABETES MELLITUS WITHOUT COMPLICATION, WITHOUT LONG-TERM CURRENT USE OF INSULIN: Primary | ICD-10-CM

## 2025-04-25 NOTE — TELEPHONE ENCOUNTER
Lenny requesting drug change on Ozempic 0.25 mg    *Insurance will cover Liraglutide, Mounaro, Trulicity , Please advise

## 2025-05-29 RX ORDER — EZETIMIBE 10 MG/1
10 TABLET ORAL DAILY
Qty: 90 TABLET | Refills: 3 | Status: SHIPPED | OUTPATIENT
Start: 2025-05-29

## 2025-05-31 DIAGNOSIS — E78.2 MIXED HYPERLIPIDEMIA: ICD-10-CM

## 2025-06-02 RX ORDER — ATORVASTATIN CALCIUM 20 MG/1
20 TABLET, FILM COATED ORAL NIGHTLY
Qty: 90 TABLET | Refills: 1 | Status: SHIPPED | OUTPATIENT
Start: 2025-06-02

## 2025-06-10 ENCOUNTER — TELEPHONE (OUTPATIENT)
Dept: INTERNAL MEDICINE | Age: 70
End: 2025-06-10
Payer: MEDICARE

## 2025-06-10 NOTE — TELEPHONE ENCOUNTER
Caller: SLIME FERNANDEZ    Relationship: Emergency Contact    Best call back number: [phone]     What medication are you requesting: OZEMPIC      If a prescription is needed, what is your preferred pharmacy and phone number: [id number] - [place], [place] - [address] AT [facility] - [phone] [facility] [phone]      Additional notes: PATIENT'S WIFE STATES THAT PATIENT HAS ONE DOSE LEFT OF MEDICATION FOR 06.16.2025. PATIENT ALSO ONLY HAS ONE NEEDLE LEFT FOR MEDICATION. CALLER UNSURE IF PATIENT IS DUE FOR A DOSAGE INCREASE OR IF SHE COULD HAVE GIVEN THE PATIENT THE WRONG DOSE OF MEDICATION. PLEASE ADVISE

## 2025-06-10 NOTE — TELEPHONE ENCOUNTER
Spoke w/pt, clarified pt is taking 0.25 MG ozempic currently.  States he isn't feeling any side effects at this time and is doing well.    Asked when he would need to go up to 0.5 MG dose.  Please advise.

## 2025-06-16 RX ORDER — DULOXETIN HYDROCHLORIDE 60 MG/1
60 CAPSULE, DELAYED RELEASE ORAL NIGHTLY
Qty: 90 CAPSULE | Refills: 1 | Status: SHIPPED | OUTPATIENT
Start: 2025-06-16

## 2025-07-11 RX ORDER — CLOPIDOGREL BISULFATE 75 MG/1
75 TABLET ORAL DAILY
Qty: 90 TABLET | Refills: 1 | Status: SHIPPED | OUTPATIENT
Start: 2025-07-11

## 2025-07-11 NOTE — TELEPHONE ENCOUNTER
Caller: SLIME FERNANDEZ    Relationship: Emergency Contact    Best call back number:   Telephone Information:   Mobile [phone]        Requested Prescriptions:   Requested Prescriptions     Pending Prescriptions Disp Refills    clopidogrel (PLAVIX) 75 MG tablet 90 tablet 1     Sig: Take 1 tablet by mouth Daily.        Pharmacy where request should be sent: [id number] - [place] [place] - [address] [facility] AT [facility] [phone] [facility] [phone]      Last office visit with prescribing clinician: 4/21/2025   Last telemedicine visit with prescribing clinician: Visit date not found   Next office visit with prescribing clinician: 10/21/2025       Does the patient have less than a 3 day supply:  [] Yes  [x] No        Summer Bobby Loredo Rep   07/11/25 10:09 EDT

## (undated) DEVICE — GLV SURG SENSICARE PI PF LF 8.5 GRN STRL

## (undated) DEVICE — SUT SILK 4/0 TIES 18IN A183H

## (undated) DEVICE — SUT VIC 4/0 SH 27IN J415H

## (undated) DEVICE — STRIP CLS WND SUTURESTRIP/PLS 0.5X5IN TP1105

## (undated) DEVICE — GLV SURG BIOGEL LTX PF 8 1/2

## (undated) DEVICE — RADIFOCUS OPTITORQUE ANGIOGRAPHIC CATHETER: Brand: OPTITORQUE

## (undated) DEVICE — SOL NS 500ML

## (undated) DEVICE — CATH LAB PACK: Brand: MEDLINE INDUSTRIES, INC.

## (undated) DEVICE — SUT SILK 2/0 TIES 18IN A185H

## (undated) DEVICE — GOWN,PREVENTION PLUS,XLONG/XLARGE,STRL: Brand: MEDLINE

## (undated) DEVICE — ANTIBACTERIAL UNDYED BRAIDED (POLYGLACTIN 910), SYNTHETIC ABSORBABLE SUTURE: Brand: COATED VICRYL

## (undated) DEVICE — 3M™ IOBAN™ 2 ANTIMICROBIAL INCISE DRAPE 6650EZ: Brand: IOBAN™ 2

## (undated) DEVICE — FLEXCEL CAROTID SHUNT (8F, 10F, 12F, 14F): Brand: FLEXCEL CAROTID SHUNT

## (undated) DEVICE — TP UMB COTN 1/8X36 U12T

## (undated) DEVICE — SUT SILK 3/0 SH CR5 18IN C0135

## (undated) DEVICE — STERILE COTTON TIP 6IN 10PK: Brand: MEDLINE

## (undated) DEVICE — CATHETER,URETHRAL,REDRUBBER,STERILE,20FR: Brand: MEDLINE

## (undated) DEVICE — SUT SILK 3/0 TIES 18IN A184H

## (undated) DEVICE — GLIDESHEATH SLENDER STAINLESS STEEL KIT: Brand: GLIDESHEATH SLENDER

## (undated) DEVICE — CATH 4F INF PIG 145Â° 110 CM: Brand: INFINITI

## (undated) DEVICE — SUT PROLN 6/0 BV1 D/A 30IN 8709H

## (undated) DEVICE — GW FC FLOP/TP .035 260CM 3MM

## (undated) DEVICE — PK ENDART CARTOID 40

## (undated) DEVICE — DRSNG WND GZ PAD BORDERED 4X8IN STRL